# Patient Record
Sex: FEMALE | Race: WHITE | HISPANIC OR LATINO | Employment: UNEMPLOYED | URBAN - METROPOLITAN AREA
[De-identification: names, ages, dates, MRNs, and addresses within clinical notes are randomized per-mention and may not be internally consistent; named-entity substitution may affect disease eponyms.]

---

## 2017-10-16 ENCOUNTER — NON-PROVIDER VISIT (OUTPATIENT)
Dept: OBGYN | Facility: CLINIC | Age: 22
End: 2017-10-16
Payer: MEDICAID

## 2017-10-16 DIAGNOSIS — Z32.01 PREGNANCY EXAMINATION OR TEST, POSITIVE RESULT: ICD-10-CM

## 2017-10-16 LAB
INT CON NEG: NEGATIVE
INT CON POS: POSITIVE
POC URINE PREGNANCY TEST: POSITIVE

## 2017-10-16 PROCEDURE — 81025 URINE PREGNANCY TEST: CPT | Performed by: OBSTETRICS & GYNECOLOGY

## 2018-01-19 ENCOUNTER — INITIAL PRENATAL (OUTPATIENT)
Dept: OBGYN | Facility: CLINIC | Age: 23
End: 2018-01-19
Payer: MEDICAID

## 2018-01-19 DIAGNOSIS — N93.8 DUB (DYSFUNCTIONAL UTERINE BLEEDING): ICD-10-CM

## 2018-01-19 LAB — IN CLINIC OB SCAN: NORMAL

## 2018-01-19 PROCEDURE — 99203 OFFICE O/P NEW LOW 30 MIN: CPT | Mod: 25 | Performed by: OBSTETRICS & GYNECOLOGY

## 2018-01-19 PROCEDURE — 76856 US EXAM PELVIC COMPLETE: CPT | Performed by: OBSTETRICS & GYNECOLOGY

## 2018-01-19 NOTE — PROGRESS NOTES
Chief Complaint   Patient presents with   • Other             History of present illness:   22 y.o.  unknown LMP presents for confirmation of pregnancy  Doing fine  (+) FM  No contractions  No LOF< no VB    Review of systems:  Pertinent positives documented in HPI and all other systems reviewed & are negative  All PMH, PSH, allergies, social history and FH reviewed and updated today:  History reviewed. No pertinent past medical history.    History reviewed. No pertinent surgical history.    Allergies:   Allergies   Allergen Reactions   • Nkda [No Known Drug Allergy]        Social History     Social History   • Marital status: Single     Spouse name: N/A   • Number of children: N/A   • Years of education: N/A     Occupational History   • Not on file.     Social History Main Topics   • Smoking status: Never Smoker   • Smokeless tobacco: Never Used   • Alcohol use No   • Drug use: No   • Sexual activity: Yes     Partners: Male     Birth control/ protection: Condom      Comment: none      Other Topics Concern   • Not on file     Social History Narrative   • No narrative on file       History reviewed. No pertinent family history.    Physical exam:  unknown if currently breastfeeding.    General:appears stated age, is in no apparent distress, is well developed and well nourished  Head: normocephalic, non-tender  Abdomen: gravid 18-20 weeks size uterus  Skin: No rashes, or ulcers or lesions seen  Psychiatric: Patient shows appropriate affect, is alert and oriented x3, intact judgment and insight.  1. DUB (dysfunctional uterine bleeding)  POCT IN CLINIC OB SCAN   2. TAS shows a single live IUP 18w6d by fetal biometry good cardiac activity good motion. breech, anterior placenta, no previa, adeq AF JANELL 2018. no adnexal masses   3. Continue PNV  4. Hydrate  5. NEW OB 1 week

## 2018-02-07 ENCOUNTER — INITIAL PRENATAL (OUTPATIENT)
Dept: OBGYN | Facility: CLINIC | Age: 23
End: 2018-02-07
Payer: MEDICAID

## 2018-02-07 VITALS
DIASTOLIC BLOOD PRESSURE: 56 MMHG | SYSTOLIC BLOOD PRESSURE: 98 MMHG | BODY MASS INDEX: 20.91 KG/M2 | HEIGHT: 63 IN | WEIGHT: 118 LBS

## 2018-02-07 DIAGNOSIS — Z34.82 ENCOUNTER FOR SUPERVISION OF OTHER NORMAL PREGNANCY IN SECOND TRIMESTER: Primary | ICD-10-CM

## 2018-02-07 PROBLEM — Z34.92 ENCOUNTER FOR SUPERVISION OF NORMAL PREGNANCY IN SECOND TRIMESTER: Status: ACTIVE | Noted: 2018-02-07

## 2018-02-07 LAB
APPEARANCE UR: NORMAL
BILIRUB UR STRIP-MCNC: NORMAL MG/DL
COLOR UR AUTO: NORMAL
GLUCOSE UR STRIP.AUTO-MCNC: NORMAL MG/DL
KETONES UR STRIP.AUTO-MCNC: NORMAL MG/DL
LEUKOCYTE ESTERASE UR QL STRIP.AUTO: NORMAL
NITRITE UR QL STRIP.AUTO: NORMAL
PH UR STRIP.AUTO: 6 [PH] (ref 5–8)
PROT UR QL STRIP: NORMAL MG/DL
RBC UR QL AUTO: NORMAL
SP GR UR STRIP.AUTO: 1.03
UROBILINOGEN UR STRIP-MCNC: NORMAL MG/DL

## 2018-02-07 PROCEDURE — 81002 URINALYSIS NONAUTO W/O SCOPE: CPT | Performed by: NURSE PRACTITIONER

## 2018-02-07 PROCEDURE — 90686 IIV4 VACC NO PRSV 0.5 ML IM: CPT | Performed by: NURSE PRACTITIONER

## 2018-02-07 PROCEDURE — 59401 PR NEW OB VISIT: CPT | Performed by: NURSE PRACTITIONER

## 2018-02-07 PROCEDURE — 90471 IMMUNIZATION ADMIN: CPT | Performed by: NURSE PRACTITIONER

## 2018-02-07 ASSESSMENT — ENCOUNTER SYMPTOMS
CONSTITUTIONAL NEGATIVE: 1
MUSCULOSKELETAL NEGATIVE: 1
RESPIRATORY NEGATIVE: 1
EYES NEGATIVE: 1
PSYCHIATRIC NEGATIVE: 1
CARDIOVASCULAR NEGATIVE: 1
NEUROLOGICAL NEGATIVE: 1
GASTROINTESTINAL NEGATIVE: 1

## 2018-02-07 NOTE — LETTER
Cystic Fibrosis Carrier Testing  Vika Mckeon    The following information is about a blood test that can be done to determine if you and/or your partner carry the gene for cystic fibrosis.    WHAT IS CYSTIC FIBROSIS?  · Cystic fibrosis (CF) is an inherited disease that affects more than 25,000 American children and young adults.  · Symptoms of CF vary but include lung congestion, pneumonia, diarrhea and poor growth.  Most people with CF have severe medical problems and some die at a young age.  Others have so few symptoms they are unaware they have CF.  · CF does not affect intelligence.  · Although there is no cure for CF at this time, scientists are making progress in improving treatment and in searching for a cure.  In the past many people with CF  at a very young age.  Today, many are living into their 20’s and 30’s.    IS THERE A CHANCE MY BABY COULD HAVE CYSTIC FIBROSIS?  · You can have a child with CF even if there is no history in your family (see chart below).  · CF testing can help determine if you are a carrier and at risk to have a child with CF.  Note: if both parents are carriers, there is a 1 in 4 (25%) chance with each pregnancy that they will have a child with CF.  · Carriers have one normal CF gene and one altered CF gene.  · People with CF have two altered CF genes.  · Most people have two normal copies of the CF gene.    Approximate risk that a couple with no family history of cystic fibrosis will have a child with cystic fibrosis:    Ethnic background / Risk     couple:  1 in 2,500   couple:  1 in 15,000            couple:  1 in 8,000     American couple:  1 in 32,000     WHAT TESTING IS AVAILABLE?  · There is a blood test that can be done to find out if you or your partner is a carrier.  · It is important to understand that CF carrier testing does not detect all CF carriers.  · If the test shows that you are both CF carriers, you unborn baby can  be tested to find out if the baby has CF.    HOW MUCH DOES IT COST TO HAVE CYSTIC FIBROSIS CARRIER TESTING?  · Cost and insurance coverage for CF carrier testing vary depending upon the laboratory used and your insurance policy.  · The average cost for CF carrier testing is $300 per person.  · Your genetic counselor can provide you with more information about cystic fibrosis carrier testing.    _____  Yes, I am interested in discussing carrier testing with a genetic counselor.    _____  No, I am not interested in CF carrier testing or in receiving more information about CF carrier testing.      Client signature: ________________________________________  2/7/2018

## 2018-02-07 NOTE — LETTER
Cuente los Movimientos de naylor Bebé  Otro paso importante para la manohar de naylor bebé    Vika Vriza Mckeon     [facility]            Dept: [phone]    ¿Cuántas semanas tiene de embarazo? 21w4d    Fecha cuando tiene que comenzar a contar el movimiento: ***                  Rain debe usar fermín diagrama    Ml manera en que naylor doctor puede controlar a manohar de naylor bebé es sabiendo cuantas veces se mueve naylor bebé en el útero, o por medio de las “pataditas”.  Usted podrá ayudarle a naylor médico al usar cada día el siguiente diagrama.    Cada día, usted debe prestar atención a cuantas horas le lleva a naylor bebé moverse 10 veces.  Comience a contar en la mañana, lo antes posible después de haberse levantado.    · Primeramente, escriba la hora en que se mueve naylor bebé, hasta llegar a 10 veces.  · Colóquele un check o palomita a cada cuadrito cada vez que naylor bebé se mueva hasta que complete 10 veces.  · Escriba la hora cuando termine de contar 10 veces en la última columna.  · Sume el total del tiempo que le llevó contar los 10 movimientos.  · Finalmente, complete el cuadrito de cuantas horas le llevó hacerlo.    Después de henry contado los 10 movimientos, ya no tendrá que contar los demás movimientos por el isabel del día.  A la mañana siguiente, comience a contar de nuevo cuantas veces se mueve el bebé desde el momento en que se levante.    ¿Qué tendría que considerarse un “movimiento”?  Es difícil de decirlo porque es distinto de ml madre a otra, y de un embarazo a otro.  Lo importante es que cuente el movimiento de la misma manera amanda el transcurso de naylro embarazo.  Si tiene preguntas adicionales, pregúntele a naylor doctor.    ¡Cuente cuidadosamente cada día!     MUESTRA:  Semana 28    ¿Cuántas horas le ha llevado sentir 10 movimientos?        Hora de Inicio     1     2     3     4     5     6     7     8     9     10   Hora de Finlizar   Jeana. 8:20 ·  ·  ·  ·  ·  ·  ·  ·  ·  ·  11:40   Mar.               Mié.                Jue.               Vie.               Sáb.               Dom.                 IMPORTANTE:  Usted debe contactar a naylor doctor si le lleva más de 2 horas sentir 10 movimientos de naylor bebé.    Cada mañana, escriba la hora de inicio y comience a contar los movimientos de naylor bebé.  Hágalo colocándole un check o palomita a cada cuadrito cada vez que sienta un movimiento de naylor bebé.  Cuando haya sentido 10 “pataditas”, escriba la hora en que terminó de contar en la última columna.  Luego, complete en la cajita (arriba de la ramesh de check o palomita) el número total de horas que le llevó hacerlo.  Asegúrese de leer completamente las instrucciones en la página anterior.

## 2018-02-07 NOTE — LETTER
"Count Your Baby's Movements  Another step to a healthy delivery              Dept: [phone]    How Many Weeks Pregnant? (Start at 28 weeks)  Date to Begin Counting: ***              How to use this chart    One way for your physician to keep track of your baby's health is by knowing how often the baby moves (or \"kicks\") in your womb.  You can help your physician to do this by using this chart every day.    Every day, you should see how many hours it takes for your baby to move 10 times.  Start in the morning, as soon as you get up.    · First, write down the time your baby moves until you get to 10.  · Check off one box every time your baby moves until you get to 10.  · Write down the time you finished counting in the last column.  · Total how long it took to count up all 10 movements.  · Finally, fill in the box that shows how long this took.  After counting 10 movements, you no longer have to count any more that day.  The next morning, just start counting again as soon as you get up.    What should you call a \"movement\"?  It is hard to say, because it will feel different from one mother to another and from one pregnancy to the next.  The important thing is that you count the movements the same way throughout your pregnancy.  If you have more questions, you should ask your physician.    Count carefully every day!  SAMPLE:  Week 28    How many hours did it take to feel 10 movements?       Start  Time     1     2     3     4     5     6     7     8     9     10   Finish Time   Mon 8:20 ·  ·  ·  ·  ·  ·  ·  ·  ·  ·  11:40   Tue Wed Thu Fri               Sat               Sun                 IMPORTANT: You should contact your physician if it takes more than two hours for you to feel 10 movements.  Each morning, write down the time and start to count the movements of your baby.  Keep track by checking off one box every time you feel one movement.  When you have felt 10 " "\"kicks\", write down the time you finished counting in the last column.  Then fill in the   box (over the check jose) for the number of hours it took.  Be sure to read the complete instructions on the previous page.            "

## 2018-02-07 NOTE — PROGRESS NOTES
Pt here for New OB today  Phone: [phone]  Pharmacy verified  Pt is taking PNV  Desires AFP  Desires BTL   Flu Shot today   Pt c/o nose bleeds  Pt reports good FM, denies any VB, LOF and UC  Flu Shot given to patient on R Deltoid. Screening checklist reviewed with patient. VIS given

## 2018-02-07 NOTE — LETTER
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  This patient is pregnant and may continue to work.  She should not lift greater than 20 pounds and requires frequent rest periods (15 minutes every two hours).        Thank you,          AMMON Koehler.

## 2018-02-10 LAB
C TRACH DNA SPEC QL NAA+PROBE: NEGATIVE
N GONORRHOEA DNA SPEC QL NAA+PROBE: NEGATIVE

## 2018-02-12 ENCOUNTER — TELEPHONE (OUTPATIENT)
Dept: OBGYN | Facility: CLINIC | Age: 23
End: 2018-02-12

## 2018-02-14 LAB
2ND TRIMESTER 4 SCREEN SERPL-IMP: NORMAL
ABO GROUP BLD: NORMAL
BLD GP AB SCN SERPL QL: NEGATIVE
HBV SURFACE AG SERPL QL IA: NON REACTIVE
PLATELET # BLD AUTO: NORMAL 10*3/UL
RH BLD: NORMAL
RPR SER QL: NORMAL
RUBV IGG SERPL IA-ACNC: NORMAL

## 2018-02-15 DIAGNOSIS — R73.09 ELEVATED GLUCOSE TOLERANCE TEST: ICD-10-CM

## 2018-02-26 ENCOUNTER — APPOINTMENT (OUTPATIENT)
Dept: RADIOLOGY | Facility: IMAGING CENTER | Age: 23
End: 2018-02-26
Attending: NURSE PRACTITIONER
Payer: MEDICAID

## 2018-02-26 ENCOUNTER — DATING (OUTPATIENT)
Dept: OBGYN | Facility: CLINIC | Age: 23
End: 2018-02-26

## 2018-02-26 DIAGNOSIS — Z34.82 ENCOUNTER FOR SUPERVISION OF OTHER NORMAL PREGNANCY IN SECOND TRIMESTER: ICD-10-CM

## 2018-02-26 PROCEDURE — 76805 OB US >/= 14 WKS SNGL FETUS: CPT | Mod: 26 | Performed by: OBSTETRICS & GYNECOLOGY

## 2018-03-07 ENCOUNTER — ROUTINE PRENATAL (OUTPATIENT)
Dept: OBGYN | Facility: CLINIC | Age: 23
End: 2018-03-07

## 2018-03-07 VITALS — DIASTOLIC BLOOD PRESSURE: 58 MMHG | SYSTOLIC BLOOD PRESSURE: 100 MMHG | BODY MASS INDEX: 21.61 KG/M2 | WEIGHT: 122 LBS

## 2018-03-07 DIAGNOSIS — Z34.82 ENCOUNTER FOR SUPERVISION OF OTHER NORMAL PREGNANCY IN SECOND TRIMESTER: ICD-10-CM

## 2018-03-07 PROCEDURE — 90040 PR PRENATAL FOLLOW UP: CPT | Performed by: NURSE PRACTITIONER

## 2018-03-07 ASSESSMENT — PATIENT HEALTH QUESTIONNAIRE - PHQ9: CLINICAL INTERPRETATION OF PHQ2 SCORE: 0

## 2018-03-07 NOTE — PROGRESS NOTES
Ob f/u. + fetal movement good  No VB, LOF or contractions   C/O no complaints today   Phone number # [phone]  Pharmacy verified with patient  WT=   122 lbs           IL=635/[id number]  3rd trimester lab orders pended and instructions given to patient  HIV lab ordered today   Pt denies any discharge, with odor, burning, itching or painful urination

## 2018-03-07 NOTE — PROGRESS NOTES
SUBJECTIVE:  Pt is a 22 y.o.   at 25w4d  gestation. Presents today for follow-up prenatal care. Reports no issues at this time.  Reports  fetal movement. Denies cramping/contractions, bleeding or leaking of fluid. Denies dysuria, headaches, N/V, or other issues at this time. Generally feels well today.     OBJECTIVE:  - See prenatal vitals flow  Vitals:    18 1004   BP: 100/58   Weight: 55.3 kg (122 lb)                 ASSESSMENT:   - IUP at 25w4d by 18 week US   - S=D  Patient Active Problem List    Diagnosis Date Noted   • Encounter for supervision of normal pregnancy in second trimester 2018   • Rubella equivocal 2014         PLAN:  - GCT ordered along with HIV   - S/sx pregnancy and labor warning signs vs general discomforts discussed  - Fetal movements and kick counts reviewed   - Adequate hydration reinforced  - Nutrition/exercise/vitamin education; continued PNV  - S/p Flu vacc  - Anticipatory guidance given  - RTC in 3 weeks for follow-up prenatal care

## 2018-03-12 LAB
GLUCOSE 1H P 50 G GLC PO SERPL-MCNC: NORMAL MG/DL
HCT VFR BLD AUTO: NORMAL %
HGB BLD-MCNC: NORMAL G/DL
HIV 1 0 2 IC ZHVIC: NON REACTIVE
TREPONEMA PALLIDUM IGG+IGM AB [PRESENCE] IN SERUM OR PLASMA BY IMMUNOASSAY: NON REACTIVE

## 2018-03-14 ENCOUNTER — TELEPHONE (OUTPATIENT)
Dept: OBGYN | Facility: CLINIC | Age: 23
End: 2018-03-14

## 2018-03-14 DIAGNOSIS — R73.09 ELEVATED GLUCOSE TOLERANCE TEST: ICD-10-CM

## 2018-03-14 NOTE — TELEPHONE ENCOUNTER
Pt walked in today 03/14/2018 to  Get  Her lab results     AMMON Grider  Reviewed the labs and signed  1 hr glucose was abnormal. Provider order 3 hr glucose test  Pt was informed. Instructions given with the 3 hour glucose lab slip.and verbalized understanding   No further questions.

## 2018-03-14 NOTE — TELEPHONE ENCOUNTER
Elevated 1 hr GTT results, needs 3 hr test.    Unable to contact pt msg left to call back.     3/14/18 @ 5327 pt returned call.  Pt notified of abnormal 1hr gtt and need to do 3hr gtt this time. Pt instructed to fast 8-10 hrs  pt only allow to drink plain water during fasting time. Pt will call lab to schedule an appt. Advised to bring a snack for after the test is done. Pt notified will be staying in the labs for the 3hr. Pt agreed to do it this week. Pt verbalized understanding.    Pt will  lab slip to go to [facility].

## 2018-03-18 LAB — GLUCOSE 3H P CHAL SERPL-MCNC: NORMAL MG/DL

## 2018-03-27 ENCOUNTER — TELEPHONE (OUTPATIENT)
Dept: OBGYN | Facility: CLINIC | Age: 23
End: 2018-03-27

## 2018-03-28 ENCOUNTER — ROUTINE PRENATAL (OUTPATIENT)
Dept: OBGYN | Facility: CLINIC | Age: 23
End: 2018-03-28
Payer: MEDICAID

## 2018-03-28 VITALS — WEIGHT: 122 LBS | BODY MASS INDEX: 21.61 KG/M2 | SYSTOLIC BLOOD PRESSURE: 112 MMHG | DIASTOLIC BLOOD PRESSURE: 60 MMHG

## 2018-03-28 DIAGNOSIS — Z34.82 ENCOUNTER FOR SUPERVISION OF OTHER NORMAL PREGNANCY IN SECOND TRIMESTER: ICD-10-CM

## 2018-03-28 DIAGNOSIS — R73.09 ELEVATED GLUCOSE TOLERANCE TEST: ICD-10-CM

## 2018-03-28 PROCEDURE — 90040 PR PRENATAL FOLLOW UP: CPT | Performed by: NURSE PRACTITIONER

## 2018-03-28 NOTE — PROGRESS NOTES
Pt here today for OB follow up  Reports +FM  WT: 122 lb  BP: 112/60  ADIEL sheet given and explained today  Declines Tdap vaccine  Pt states no complaints today  Declines BTL  Good # [phone]

## 2018-03-28 NOTE — PROGRESS NOTES
SUBJECTIVE:  Pt is a 22 y.o.   at 28w4d  gestation. Presents today for follow-up prenatal care. Reports no issues at this time.  Reports good  fetal movement. Denies cramping/contractions, bleeding or leaking of fluid. Denies dysuria, headaches, N/V, or other issues at this time. Generally feels well today.     OBJECTIVE:  - See prenatal vitals flow  -   Vitals:    18 1618   BP: 112/60   Weight: 55.3 kg (122 lb)      Labs - normal prenatal panel, rubella equiv. Normal 3 hour gucose.   US - normal fetal survey            ASSESSMENT:   - IUP at 28w4d   - S=D   -   Patient Active Problem List    Diagnosis Date Noted   • normal 3 hour glucose.  2018   • Encounter for supervision of normal pregnancy in second trimester 2018   • Rubella equivocal 2014         PLAN:  - S/sx pregnancy and labor warning signs vs general discomforts discussed  - Fetal movements and kick counts reviewed   - Adequate hydration reinforced  - Nutrition/exercise/vitamin education; continued PNV  -declines BTL  Declines Tdap

## 2018-03-28 NOTE — LETTER
"Count Your Baby's Movements  Another step to a healthy delivery    Vika Mckeon             Dept: [phone]    How Many Weeks Pregnant? 28W4D    Date to Begin Countin2018              How to use this chart    One way for your physician to keep track of your baby's health is by knowing how often the baby moves (or \"kicks\") in your womb.  You can help your physician to do this by using this chart every day.    Every day, you should see how many hours it takes for your baby to move 10 times.  Start in the morning, as soon as you get up.    · First, write down the time your baby moves until you get to 10.  · Check off one box every time your baby moves until you get to 10.  · Write down the time you finished counting in the last column.  · Total how long it took to count up all 10 movements.  · Finally, fill in the box that shows how long this took.  After counting 10 movements, you no longer have to count any more that day.  The next morning, just start counting again as soon as you get up.    What should you call a \"movement\"?  It is hard to say, because it will feel different from one mother to another and from one pregnancy to the next.  The important thing is that you count the movements the same way throughout your pregnancy.  If you have more questions, you should ask your physician.    Count carefully every day!  SAMPLE:  Week 28    How many hours did it take to feel 10 movements?       Start  Time     1     2     3     4     5     6     7     8     9     10   Finish Time   Mon 8:20 ·  ·  ·  ·  ·  ·  ·  ·  ·  ·  11:40                  Sat               Sun                 IMPORTANT: You should contact your physician if it takes more than two hours for you to feel 10 movements.  Each morning, write down the time and start to count the movements of your baby.  Keep track by checking off one box every time you feel one movement.  When you have " "felt 10 \"kicks\", write down the time you finished counting in the last column.  Then fill in the   box (over the check jose) for the number of hours it took.  Be sure to read the complete instructions on the previous page.            "

## 2018-04-11 ENCOUNTER — ROUTINE PRENATAL (OUTPATIENT)
Dept: OBGYN | Facility: CLINIC | Age: 23
End: 2018-04-11
Payer: MEDICAID

## 2018-04-11 VITALS — WEIGHT: 125 LBS | SYSTOLIC BLOOD PRESSURE: 98 MMHG | DIASTOLIC BLOOD PRESSURE: 62 MMHG | BODY MASS INDEX: 22.14 KG/M2

## 2018-04-11 DIAGNOSIS — Z34.83 ENCOUNTER FOR SUPERVISION OF OTHER NORMAL PREGNANCY IN THIRD TRIMESTER: ICD-10-CM

## 2018-04-11 DIAGNOSIS — O36.8190 DECREASED FETAL MOVEMENT DURING PREGNANCY, ANTEPARTUM, SINGLE OR UNSPECIFIED FETUS: ICD-10-CM

## 2018-04-11 PROBLEM — Z34.93 ENCOUNTER FOR SUPERVISION OF NORMAL PREGNANCY IN THIRD TRIMESTER: Status: ACTIVE | Noted: 2018-02-07

## 2018-04-11 LAB
NST ACOUSTIC STIMULATION: NORMAL
NST ACTION NECESSARY: NORMAL
NST ASSESSMENT: NORMAL
NST BASELINE: 135
NST INDICATIONS: NORMAL
NST OTHER DATA: NORMAL
NST READ BY: NORMAL
NST RETURN: NORMAL
NST UTERINE ACTIVITY: NORMAL

## 2018-04-11 PROCEDURE — 90715 TDAP VACCINE 7 YRS/> IM: CPT | Performed by: NURSE PRACTITIONER

## 2018-04-11 PROCEDURE — 90040 PR PRENATAL FOLLOW UP: CPT | Performed by: NURSE PRACTITIONER

## 2018-04-11 PROCEDURE — 90471 IMMUNIZATION ADMIN: CPT | Performed by: NURSE PRACTITIONER

## 2018-04-11 PROCEDURE — 59025 FETAL NON-STRESS TEST: CPT | Performed by: NURSE PRACTITIONER

## 2018-04-11 NOTE — PROGRESS NOTES
OB F/U  Denies VB, LOF, or UC  Phone#: [phone]  Pharmacy Confirmed.  C/O: decrease FM indicates she has experience 4 movements today, patient states she thinks she might be starting to loose her mucus plug.

## 2018-04-11 NOTE — PROGRESS NOTES
SUBJECTIVE:  Pt is a 22 y.o.   at 30w4d  gestation. Presents today for follow-up prenatal care. Reports no issues at this time.  Reports kicks not as strong fetal movement, has not been doing kick counts. Denies cramping/contractions, bleeding or leaking of fluid. Denies dysuria, headaches, N/V or other issues at this time. Generally feels well today.     OBJECTIVE:  - See prenatal vitals flow  Vitals:    18 1401   BP: (!) 98/62   Weight: 56.7 kg (125 lb)                 ASSESSMENT:   - IUP at 30w4d by 18 week US   - S=D   -   Patient Active Problem List    Diagnosis Date Noted   • Encounter for supervision of normal pregnancy in third trimester 2018   • Rubella equivocal 2014         PLAN:  - For NST now: reactive tracing for 30 weeks, 10 movements appreciated in NST time  - Reeducated on how to do FKC  - S/sx pregnancy and labor warning signs vs general discomforts discussed  - Fetal movements and kick counts reviewed   - Adequate hydration reinforced  - Nutrition/exercise/vitamin education; continued PNV   - S/p TDAP vacc today   - S/p Flu vacc  - Anticipatory guidance given  - RTC in 2 weeks for follow-up prenatal care

## 2018-04-11 NOTE — NON-PROVIDER
TDap given to patient. R  Deltoid. Screening checklist reviewed with patient. VIS given. Verified by AO

## 2018-05-01 ENCOUNTER — ROUTINE PRENATAL (OUTPATIENT)
Dept: OBGYN | Facility: CLINIC | Age: 23
End: 2018-05-01
Payer: MEDICAID

## 2018-05-01 VITALS — BODY MASS INDEX: 22.67 KG/M2 | WEIGHT: 128 LBS | DIASTOLIC BLOOD PRESSURE: 60 MMHG | SYSTOLIC BLOOD PRESSURE: 100 MMHG

## 2018-05-01 DIAGNOSIS — Z34.83 ENCOUNTER FOR SUPERVISION OF OTHER NORMAL PREGNANCY IN THIRD TRIMESTER: Primary | ICD-10-CM

## 2018-05-01 PROCEDURE — 90040 PR PRENATAL FOLLOW UP: CPT | Performed by: NURSE PRACTITIONER

## 2018-05-01 NOTE — PROGRESS NOTES
S:  Pt is  at 33w3d for routine OB follow up.  Reports some vaginal pain.  Reports good FM.  Denies VB, LOF, RUCs or vaginal DC.    O:  Please see above vitals.        FHTs: 158        Fundal ht: 33 cm.    A:  IUP at 33w3d  Patient Active Problem List    Diagnosis Date Noted   • Encounter for supervision of normal pregnancy in third trimester 2018   • Rubella equivocal 2014        P:  1.  GBS @ 35 wks.          2.  Continue FKCs.          3.  Questions answered.          4.  Encouraged pt to tour L&D.          5.  Encourage adequate water intake.        6.  F/u 2 wks.        7.  D/w pt helps for vaginal pain.

## 2018-05-01 NOTE — PATIENT INSTRUCTIONS
P:  1.  GBS @ 35 wks.          2.  Continue FKCs.          3.  Questions answered.          4.  Encouraged pt to tour L&D.          5.  Encourage adequate water intake.        6.  F/u 2 wks.        7.  D/w pt helps for vaginal pain.

## 2018-05-01 NOTE — PROGRESS NOTES
Pt. Here for OB/FU today. Reports Good FM.   Good # [phone]  Pt states having some cramping and her vaginal feels sore.   Pharmacy verified.

## 2018-05-09 ENCOUNTER — TELEPHONE (OUTPATIENT)
Dept: OBGYN | Facility: CLINIC | Age: 23
End: 2018-05-09

## 2018-05-09 NOTE — TELEPHONE ENCOUNTER
"Pt's call was transferred to me by Kavya JUAREZ). I spoke with pt. Pt stated for the past 2 days she has been having pelvic pressure \" it hurts to walk\", cramping off and on, and belly tightness 1-2 hours apart for the past 3 days. Reported +FM, denied vaginal bleeding or LOF. Per consult with Hortensia Silverman pt given  labor precautions, pt was also instructed to increase her water intake to 3-4 liters daily, rest as  Much as she can and try taking a warm bath. Advised that if any of the above symptoms worsens to go to L&D for evaluation. Pt verbalized understanding and will comply with instructions and had no further questions.  "

## 2018-05-15 ENCOUNTER — ROUTINE PRENATAL (OUTPATIENT)
Dept: OBGYN | Facility: CLINIC | Age: 23
End: 2018-05-15
Payer: MEDICAID

## 2018-05-15 VITALS — BODY MASS INDEX: 22.5 KG/M2 | WEIGHT: 127 LBS | DIASTOLIC BLOOD PRESSURE: 64 MMHG | SYSTOLIC BLOOD PRESSURE: 118 MMHG

## 2018-05-15 DIAGNOSIS — B96.89 BV (BACTERIAL VAGINOSIS): ICD-10-CM

## 2018-05-15 DIAGNOSIS — Z34.83 ENCOUNTER FOR SUPERVISION OF OTHER NORMAL PREGNANCY IN THIRD TRIMESTER: Primary | ICD-10-CM

## 2018-05-15 DIAGNOSIS — N76.0 BV (BACTERIAL VAGINOSIS): ICD-10-CM

## 2018-05-15 PROCEDURE — 90040 PR PRENATAL FOLLOW UP: CPT | Performed by: NURSE PRACTITIONER

## 2018-05-15 RX ORDER — METRONIDAZOLE 500 MG/1
500 TABLET ORAL EVERY 12 HOURS
Qty: 14 TAB | Refills: 0 | Status: SHIPPED | OUTPATIENT
Start: 2018-05-15 | End: 2018-05-22

## 2018-05-15 NOTE — PROGRESS NOTES
S:  Pt is  at 35w3d here for routine OB follow up.  Reports questionable LOF and vaginal pain.  Reports good FM.  Denies VB, RUCs, or vaginal DC.     O:  Please see above vitals.        FHTs: 144        Fundal ht: 35 cm.        Fetal position: vertex.        SVE: cl/50/-3        SSE: neg ferning, neg nitrazine, neg pooling.        Wet mount: +clue cells    A:  IUP at 35w3d  Patient Active Problem List    Diagnosis Date Noted   • BV (bacterial vaginosis) 05/15/2018   • Encounter for supervision of normal pregnancy in third trimester 2018   • Rubella equivocal 2014       P:  1.  GBS obtained.          2.  Labor precautions given.  Instructions given on where to go.  Pt receptive to              education.          3.  Questions answered.          4.  Continue FKCs.          5.  Encouraged adequate water intake        6.  F/u 1 wk.        7.  Rx sent to pharmacy.

## 2018-05-15 NOTE — PROGRESS NOTES
OB f/u. + fetal movement.  No VB, LOF or UC's.  Wt:127lb       BP:118/64  Good phone # [phone]  Preferred pharmacy confirmed.  GBS today  c/o ? Leaking of fluid past weekend and yesterday

## 2018-05-15 NOTE — PATIENT INSTRUCTIONS
P:  1.  GBS obtained.          2.  Labor precautions given.  Instructions given on where to go.  Pt receptive to              education.          3.  Questions answered.          4.  Continue FKCs.          5.  Encouraged adequate water intake        6.  F/u 1 wk.        7.  Rx sent to pharmacy.

## 2018-05-18 LAB — GP B STREP DNA SPEC QL NAA+PROBE: NEGATIVE

## 2018-05-24 ENCOUNTER — ROUTINE PRENATAL (OUTPATIENT)
Dept: OBGYN | Facility: CLINIC | Age: 23
End: 2018-05-24
Payer: MEDICAID

## 2018-05-24 VITALS — DIASTOLIC BLOOD PRESSURE: 62 MMHG | BODY MASS INDEX: 22.85 KG/M2 | WEIGHT: 129 LBS | SYSTOLIC BLOOD PRESSURE: 98 MMHG

## 2018-05-24 DIAGNOSIS — N76.0 BV (BACTERIAL VAGINOSIS): ICD-10-CM

## 2018-05-24 DIAGNOSIS — B96.89 BV (BACTERIAL VAGINOSIS): ICD-10-CM

## 2018-05-24 DIAGNOSIS — Z34.83 ENCOUNTER FOR SUPERVISION OF OTHER NORMAL PREGNANCY IN THIRD TRIMESTER: ICD-10-CM

## 2018-05-24 PROCEDURE — 90040 PR PRENATAL FOLLOW UP: CPT | Performed by: PHYSICIAN ASSISTANT

## 2018-05-24 NOTE — PROGRESS NOTES
Ob f/u. + fetal movement good  No VB, LOF or contractions   C/O faisal simental   Phone number # [phone]  Pharmacy verified with patient  WT=   129 lbs           BP=98/62  GBS negative

## 2018-05-24 NOTE — PROGRESS NOTES
Pt has no complaints with cramping, strong or regular UCs, VB, LOF. +FM. GBS neg - pt notified of results. Labor precautions reviewed. Cervix: 1-2/75-0, post, soft, vtx. Daily FKC and walks recommended. RTC 1 wk or sooner prn.

## 2018-05-30 ENCOUNTER — ROUTINE PRENATAL (OUTPATIENT)
Dept: OBGYN | Facility: CLINIC | Age: 23
End: 2018-05-30
Payer: MEDICAID

## 2018-05-30 VITALS — SYSTOLIC BLOOD PRESSURE: 102 MMHG | WEIGHT: 129 LBS | BODY MASS INDEX: 22.85 KG/M2 | DIASTOLIC BLOOD PRESSURE: 66 MMHG

## 2018-05-30 DIAGNOSIS — Z34.83 ENCOUNTER FOR SUPERVISION OF OTHER NORMAL PREGNANCY IN THIRD TRIMESTER: ICD-10-CM

## 2018-05-30 PROCEDURE — 90040 PR PRENATAL FOLLOW UP: CPT | Performed by: NURSE PRACTITIONER

## 2018-05-30 ASSESSMENT — PATIENT HEALTH QUESTIONNAIRE - PHQ9: CLINICAL INTERPRETATION OF PHQ2 SCORE: 0

## 2018-05-30 NOTE — PROGRESS NOTES
Ob f/u. + fetal movement good  No VB, LOF or contractions   C/O pain and contractions   Phone number # [phone]  Pharmacy verified with patient  WT=  129 lbs            TR=668/66

## 2018-06-04 ENCOUNTER — TELEPHONE (OUTPATIENT)
Dept: OBGYN | Facility: CLINIC | Age: 23
End: 2018-06-04

## 2018-06-04 NOTE — TELEPHONE ENCOUNTER
C/O spotting, having some UC's 3 in 1 hr, reports +FM, denies other complications.  labor precautions given(* LOF,  VB or UC's 3-5 minaprt in 2 hrs), verbalized understanding.

## 2018-06-08 ENCOUNTER — ROUTINE PRENATAL (OUTPATIENT)
Dept: OBGYN | Facility: CLINIC | Age: 23
End: 2018-06-08
Payer: MEDICAID

## 2018-06-08 VITALS — DIASTOLIC BLOOD PRESSURE: 62 MMHG | BODY MASS INDEX: 23.21 KG/M2 | WEIGHT: 131 LBS | SYSTOLIC BLOOD PRESSURE: 98 MMHG

## 2018-06-08 DIAGNOSIS — Z34.83 ENCOUNTER FOR SUPERVISION OF OTHER NORMAL PREGNANCY IN THIRD TRIMESTER: ICD-10-CM

## 2018-06-08 PROCEDURE — 90040 PR PRENATAL FOLLOW UP: CPT | Performed by: PHYSICIAN ASSISTANT

## 2018-06-08 NOTE — PROGRESS NOTES
Pt has no complaints with cramping, strong or regular UCs, VB, LOF, though pt does have occ UCs up to q 5-7 minutes apart. Pt hasnt gone in because they arent strong. +FM. Cervix: 3-4/75/-1, post, med, vtx. Labor precautions reviewed. Daily FKC and walks recommended. RTC 1 wk or sooner prn. IOL referral sent today.

## 2018-06-08 NOTE — PROGRESS NOTES
Pt. Here for OB/FU today. Reports Good FM.   Good # [phone]  Pt states she believes she is having [place] Kowalski.    Pharmacy verified.

## 2018-06-10 ENCOUNTER — HOSPITAL ENCOUNTER (OUTPATIENT)
Facility: MEDICAL CENTER | Age: 23
End: 2018-06-10
Attending: OBSTETRICS & GYNECOLOGY | Admitting: OBSTETRICS & GYNECOLOGY
Payer: MEDICAID

## 2018-06-10 ENCOUNTER — HOSPITAL ENCOUNTER (INPATIENT)
Facility: MEDICAL CENTER | Age: 23
LOS: 2 days | End: 2018-06-12
Attending: OBSTETRICS & GYNECOLOGY | Admitting: OBSTETRICS & GYNECOLOGY
Payer: MEDICAID

## 2018-06-10 VITALS
TEMPERATURE: 97.8 F | RESPIRATION RATE: 18 BRPM | HEIGHT: 63 IN | HEART RATE: 71 BPM | WEIGHT: 131 LBS | BODY MASS INDEX: 23.21 KG/M2 | SYSTOLIC BLOOD PRESSURE: 128 MMHG | DIASTOLIC BLOOD PRESSURE: 80 MMHG

## 2018-06-10 LAB
BASOPHILS # BLD AUTO: 0.3 % (ref 0–1.8)
BASOPHILS # BLD: 0.03 K/UL (ref 0–0.12)
EOSINOPHIL # BLD AUTO: 0.03 K/UL (ref 0–0.51)
EOSINOPHIL NFR BLD: 0.3 % (ref 0–6.9)
ERYTHROCYTE [DISTWIDTH] IN BLOOD BY AUTOMATED COUNT: 37.2 FL (ref 35.9–50)
HCT VFR BLD AUTO: 35.6 % (ref 37–47)
HGB BLD-MCNC: 12.1 G/DL (ref 12–16)
HOLDING TUBE BB 8507: NORMAL
IMM GRANULOCYTES # BLD AUTO: 0.09 K/UL (ref 0–0.11)
IMM GRANULOCYTES NFR BLD AUTO: 0.8 % (ref 0–0.9)
LYMPHOCYTES # BLD AUTO: 1.97 K/UL (ref 1–4.8)
LYMPHOCYTES NFR BLD: 17.1 % (ref 22–41)
MCH RBC QN AUTO: 28.6 PG (ref 27–33)
MCHC RBC AUTO-ENTMCNC: 34 G/DL (ref 33.6–35)
MCV RBC AUTO: 84.2 FL (ref 81.4–97.8)
MONOCYTES # BLD AUTO: 0.62 K/UL (ref 0–0.85)
MONOCYTES NFR BLD AUTO: 5.4 % (ref 0–13.4)
NEUTROPHILS # BLD AUTO: 8.75 K/UL (ref 2–7.15)
NEUTROPHILS NFR BLD: 76.1 % (ref 44–72)
NRBC # BLD AUTO: 0 K/UL
NRBC BLD-RTO: 0 /100 WBC
PLATELET # BLD AUTO: 222 K/UL (ref 164–446)
PMV BLD AUTO: 9 FL (ref 9–12.9)
RBC # BLD AUTO: 4.23 M/UL (ref 4.2–5.4)
WBC # BLD AUTO: 11.5 K/UL (ref 4.8–10.8)

## 2018-06-10 PROCEDURE — 85025 COMPLETE CBC W/AUTO DIFF WBC: CPT

## 2018-06-10 PROCEDURE — 700101 HCHG RX REV CODE 250

## 2018-06-10 PROCEDURE — 304965 HCHG RECOVERY SERVICES

## 2018-06-10 PROCEDURE — 0UQMXZZ REPAIR VULVA, EXTERNAL APPROACH: ICD-10-PCS | Performed by: OBSTETRICS & GYNECOLOGY

## 2018-06-10 PROCEDURE — 10907ZC DRAINAGE OF AMNIOTIC FLUID, THERAPEUTIC FROM PRODUCTS OF CONCEPTION, VIA NATURAL OR ARTIFICIAL OPENING: ICD-10-PCS | Performed by: OBSTETRICS & GYNECOLOGY

## 2018-06-10 PROCEDURE — 770002 HCHG ROOM/CARE - OB PRIVATE (112)

## 2018-06-10 PROCEDURE — 36415 COLL VENOUS BLD VENIPUNCTURE: CPT

## 2018-06-10 PROCEDURE — 700105 HCHG RX REV CODE 258: Performed by: NURSE PRACTITIONER

## 2018-06-10 PROCEDURE — 700111 HCHG RX REV CODE 636 W/ 250 OVERRIDE (IP): Performed by: NURSE PRACTITIONER

## 2018-06-10 PROCEDURE — 59025 FETAL NON-STRESS TEST: CPT | Performed by: OBSTETRICS & GYNECOLOGY

## 2018-06-10 PROCEDURE — 59409 OBSTETRICAL CARE: CPT

## 2018-06-10 PROCEDURE — 700111 HCHG RX REV CODE 636 W/ 250 OVERRIDE (IP)

## 2018-06-10 RX ORDER — OXYCODONE HYDROCHLORIDE AND ACETAMINOPHEN 5; 325 MG/1; MG/1
1 TABLET ORAL EVERY 4 HOURS PRN
Status: DISCONTINUED | OUTPATIENT
Start: 2018-06-10 | End: 2018-06-11

## 2018-06-10 RX ORDER — SODIUM CHLORIDE, SODIUM LACTATE, POTASSIUM CHLORIDE, CALCIUM CHLORIDE 600; 310; 30; 20 MG/100ML; MG/100ML; MG/100ML; MG/100ML
INJECTION, SOLUTION INTRAVENOUS CONTINUOUS
Status: DISCONTINUED | OUTPATIENT
Start: 2018-06-10 | End: 2018-06-11 | Stop reason: HOSPADM

## 2018-06-10 RX ORDER — SODIUM CHLORIDE, SODIUM LACTATE, POTASSIUM CHLORIDE, CALCIUM CHLORIDE 600; 310; 30; 20 MG/100ML; MG/100ML; MG/100ML; MG/100ML
INJECTION, SOLUTION INTRAVENOUS
Status: ACTIVE
Start: 2018-06-10 | End: 2018-06-11

## 2018-06-10 RX ORDER — LIDOCAINE HYDROCHLORIDE AND EPINEPHRINE 15; 5 MG/ML; UG/ML
INJECTION, SOLUTION EPIDURAL
Status: COMPLETED
Start: 2018-06-10 | End: 2018-06-10

## 2018-06-10 RX ORDER — ONDANSETRON 4 MG/1
4 TABLET, ORALLY DISINTEGRATING ORAL EVERY 6 HOURS PRN
Status: DISCONTINUED | OUTPATIENT
Start: 2018-06-10 | End: 2018-06-12 | Stop reason: HOSPADM

## 2018-06-10 RX ORDER — LIDOCAINE HYDROCHLORIDE 10 MG/ML
INJECTION, SOLUTION EPIDURAL; INFILTRATION; INTRACAUDAL; PERINEURAL
Status: COMPLETED
Start: 2018-06-10 | End: 2018-06-10

## 2018-06-10 RX ORDER — MISOPROSTOL 200 UG/1
800 TABLET ORAL
Status: DISCONTINUED | OUTPATIENT
Start: 2018-06-10 | End: 2018-06-11 | Stop reason: HOSPADM

## 2018-06-10 RX ORDER — ALUMINA, MAGNESIA, AND SIMETHICONE 2400; 2400; 240 MG/30ML; MG/30ML; MG/30ML
30 SUSPENSION ORAL EVERY 6 HOURS PRN
Status: DISCONTINUED | OUTPATIENT
Start: 2018-06-10 | End: 2018-06-11 | Stop reason: HOSPADM

## 2018-06-10 RX ORDER — OXYCODONE HYDROCHLORIDE AND ACETAMINOPHEN 5; 325 MG/1; MG/1
2 TABLET ORAL EVERY 4 HOURS PRN
Status: DISCONTINUED | OUTPATIENT
Start: 2018-06-10 | End: 2018-06-12 | Stop reason: HOSPADM

## 2018-06-10 RX ORDER — ONDANSETRON 2 MG/ML
4 INJECTION INTRAMUSCULAR; INTRAVENOUS EVERY 6 HOURS PRN
Status: DISCONTINUED | OUTPATIENT
Start: 2018-06-10 | End: 2018-06-12 | Stop reason: HOSPADM

## 2018-06-10 RX ORDER — IBUPROFEN 600 MG/1
600 TABLET ORAL EVERY 6 HOURS PRN
Status: DISCONTINUED | OUTPATIENT
Start: 2018-06-10 | End: 2018-06-11

## 2018-06-10 RX ADMIN — SODIUM CHLORIDE, POTASSIUM CHLORIDE, SODIUM LACTATE AND CALCIUM CHLORIDE: 600; 310; 30; 20 INJECTION, SOLUTION INTRAVENOUS at 21:55

## 2018-06-10 RX ADMIN — FENTANYL CITRATE 100 MCG: 50 INJECTION INTRAMUSCULAR; INTRAVENOUS at 21:54

## 2018-06-10 RX ADMIN — LIDOCAINE HYDROCHLORIDE,EPINEPHRINE BITARTRATE: 15; .005 INJECTION, SOLUTION EPIDURAL; INFILTRATION; INTRACAUDAL; PERINEURAL at 23:00

## 2018-06-10 RX ADMIN — Medication 20 UNITS: at 23:00

## 2018-06-10 ASSESSMENT — COPD QUESTIONNAIRES
DO YOU EVER COUGH UP ANY MUCUS OR PHLEGM?: NO/ONLY WITH OCCASIONAL COLDS OR INFECTIONS
HAVE YOU SMOKED AT LEAST 100 CIGARETTES IN YOUR ENTIRE LIFE: NO/DON'T KNOW
IN THE PAST 12 MONTHS DO YOU DO LESS THAN YOU USED TO BECAUSE OF YOUR BREATHING PROBLEMS: DISAGREE/UNSURE
DURING THE PAST 4 WEEKS HOW MUCH DID YOU FEEL SHORT OF BREATH: NONE/LITTLE OF THE TIME

## 2018-06-10 ASSESSMENT — PATIENT HEALTH QUESTIONNAIRE - PHQ9
1. LITTLE INTEREST OR PLEASURE IN DOING THINGS: NOT AT ALL
SUM OF ALL RESPONSES TO PHQ9 QUESTIONS 1 AND 2: 0
2. FEELING DOWN, DEPRESSED, IRRITABLE, OR HOPELESS: NOT AT ALL

## 2018-06-10 ASSESSMENT — LIFESTYLE VARIABLES
EVER_SMOKED: NEVER
ALCOHOL_USE: NO

## 2018-06-10 NOTE — PROGRESS NOTES
Assumed care of PT.    Billy  Calledmarkie to ambulate when reactive tracing obtained.     0728 PT walking.    0832 SVE unchanged.    Call to markie Pena to DC with reactive tracing and labor precautions.     0819 DC instructions dicussed per sheet, PT to return for AROM, VB or increased frequency/pain of UC's or creased FM.

## 2018-06-10 NOTE — PROGRESS NOTES
"Pt is a ; JANELL of ; making her 39w1d. Pt c/o painful UCs since 0300 this morning. Pt stated \" I have been feeling contractions since my appointment 2 days ago after they stretched my cervix to a 3.\" Pt noted some VB this morning after using the BR. Denies LOF and reports +FM. VSS. EFM and TOCO applied. SVE at /-1.     Report given to LIMA Bradley RN.   "

## 2018-06-11 LAB
ERYTHROCYTE [DISTWIDTH] IN BLOOD BY AUTOMATED COUNT: 39.2 FL (ref 35.9–50)
HCT VFR BLD AUTO: 38.9 % (ref 37–47)
HGB BLD-MCNC: 12.5 G/DL (ref 12–16)
MCH RBC QN AUTO: 27.8 PG (ref 27–33)
MCHC RBC AUTO-ENTMCNC: 32.1 G/DL (ref 33.6–35)
MCV RBC AUTO: 86.4 FL (ref 81.4–97.8)
PLATELET # BLD AUTO: 173 K/UL (ref 164–446)
PMV BLD AUTO: 9 FL (ref 9–12.9)
RBC # BLD AUTO: 4.5 M/UL (ref 4.2–5.4)
WBC # BLD AUTO: 12.8 K/UL (ref 4.8–10.8)

## 2018-06-11 PROCEDURE — 700105 HCHG RX REV CODE 258: Performed by: NURSE PRACTITIONER

## 2018-06-11 PROCEDURE — 770002 HCHG ROOM/CARE - OB PRIVATE (112)

## 2018-06-11 PROCEDURE — 36415 COLL VENOUS BLD VENIPUNCTURE: CPT

## 2018-06-11 PROCEDURE — 85027 COMPLETE CBC AUTOMATED: CPT

## 2018-06-11 PROCEDURE — A9270 NON-COVERED ITEM OR SERVICE: HCPCS | Performed by: NURSE PRACTITIONER

## 2018-06-11 PROCEDURE — 700111 HCHG RX REV CODE 636 W/ 250 OVERRIDE (IP): Performed by: NURSE PRACTITIONER

## 2018-06-11 PROCEDURE — 700102 HCHG RX REV CODE 250 W/ 637 OVERRIDE(OP): Performed by: NURSE PRACTITIONER

## 2018-06-11 RX ORDER — BISACODYL 10 MG
10 SUPPOSITORY, RECTAL RECTAL PRN
Status: DISCONTINUED | OUTPATIENT
Start: 2018-06-11 | End: 2018-06-12 | Stop reason: HOSPADM

## 2018-06-11 RX ORDER — MAG HYDROX/ALUMINUM HYD/SIMETH 400-400-40
1 SUSPENSION, ORAL (FINAL DOSE FORM) ORAL
Status: DISCONTINUED | OUTPATIENT
Start: 2018-06-11 | End: 2018-06-11

## 2018-06-11 RX ORDER — OXYCODONE HYDROCHLORIDE AND ACETAMINOPHEN 5; 325 MG/1; MG/1
1 TABLET ORAL EVERY 4 HOURS PRN
Status: DISCONTINUED | OUTPATIENT
Start: 2018-06-11 | End: 2018-06-12 | Stop reason: HOSPADM

## 2018-06-11 RX ORDER — DOCUSATE SODIUM 100 MG/1
100 CAPSULE, LIQUID FILLED ORAL 2 TIMES DAILY PRN
Status: DISCONTINUED | OUTPATIENT
Start: 2018-06-11 | End: 2018-06-12 | Stop reason: HOSPADM

## 2018-06-11 RX ORDER — MISOPROSTOL 200 UG/1
600 TABLET ORAL
Status: DISCONTINUED | OUTPATIENT
Start: 2018-06-11 | End: 2018-06-12 | Stop reason: HOSPADM

## 2018-06-11 RX ORDER — IBUPROFEN 800 MG/1
800 TABLET ORAL EVERY 6 HOURS PRN
Status: DISCONTINUED | OUTPATIENT
Start: 2018-06-11 | End: 2018-06-12 | Stop reason: HOSPADM

## 2018-06-11 RX ORDER — SODIUM CHLORIDE, SODIUM LACTATE, POTASSIUM CHLORIDE, CALCIUM CHLORIDE 600; 310; 30; 20 MG/100ML; MG/100ML; MG/100ML; MG/100ML
INJECTION, SOLUTION INTRAVENOUS PRN
Status: DISCONTINUED | OUTPATIENT
Start: 2018-06-11 | End: 2018-06-12 | Stop reason: HOSPADM

## 2018-06-11 RX ORDER — VITAMIN A ACETATE, BETA CAROTENE, ASCORBIC ACID, CHOLECALCIFEROL, .ALPHA.-TOCOPHEROL ACETATE, DL-, THIAMINE MONONITRATE, RIBOFLAVIN, NIACINAMIDE, PYRIDOXINE HYDROCHLORIDE, FOLIC ACID, CYANOCOBALAMIN, CALCIUM CARBONATE, FERROUS FUMARATE, ZINC OXIDE, CUPRIC OXIDE 3080; 12; 120; 400; 1; 1.84; 3; 20; 22; 920; 25; 200; 27; 10; 2 [IU]/1; UG/1; MG/1; [IU]/1; MG/1; MG/1; MG/1; MG/1; MG/1; [IU]/1; MG/1; MG/1; MG/1; MG/1; MG/1
1 TABLET, FILM COATED ORAL EVERY MORNING
Status: DISCONTINUED | OUTPATIENT
Start: 2018-06-11 | End: 2018-06-12 | Stop reason: HOSPADM

## 2018-06-11 RX ADMIN — Medication 1 TABLET: at 10:02

## 2018-06-11 RX ADMIN — IBUPROFEN 600 MG: 600 TABLET, FILM COATED ORAL at 00:04

## 2018-06-11 RX ADMIN — SODIUM CHLORIDE, POTASSIUM CHLORIDE, SODIUM LACTATE AND CALCIUM CHLORIDE: 600; 310; 30; 20 INJECTION, SOLUTION INTRAVENOUS at 01:31

## 2018-06-11 RX ADMIN — IBUPROFEN 800 MG: 800 TABLET, FILM COATED ORAL at 06:09

## 2018-06-11 RX ADMIN — IBUPROFEN 800 MG: 800 TABLET, FILM COATED ORAL at 19:12

## 2018-06-11 RX ADMIN — Medication 125 ML/HR: at 00:05

## 2018-06-11 RX ADMIN — OXYCODONE HYDROCHLORIDE AND ACETAMINOPHEN 2 TABLET: 5; 325 TABLET ORAL at 20:32

## 2018-06-11 ASSESSMENT — PAIN SCALES - GENERAL
PAINLEVEL_OUTOF10: 2
PAINLEVEL_OUTOF10: 0
PAINLEVEL_OUTOF10: 0
PAINLEVEL_OUTOF10: 8
PAINLEVEL_OUTOF10: 3
PAINLEVEL_OUTOF10: 6
PAINLEVEL_OUTOF10: 0
PAINLEVEL_OUTOF10: 2
PAINLEVEL_OUTOF10: 6

## 2018-06-11 NOTE — H&P
History and Physical      Vika Mckeon is a 22 y.o. female  at 39w1d who presents for active labor.     Subjective:   positive  For CTXS.   positive Feels pain   negative for LOF  negative for vaginal bleeding.   positive for fetal movement    ROS: Pertinent items are noted in HPI.    History reviewed. No pertinent past medical history.  History reviewed. No pertinent surgical history.  OB History    Para Term  AB Living   2 1 1     1   SAB TAB Ectopic Molar Multiple Live Births             1      # Outcome Date GA Lbr Harish/2nd Weight Sex Delivery Anes PTL Lv   2 Current            1 Term 05/16/15 39w2d  3.11 kg (6 lb 13.7 oz) F Vag-Spont   SOWMYA      Birth Comments: No complications         Social History     Social History   • Marital status: Single     Spouse name: N/A   • Number of children: N/A   • Years of education: N/A     Occupational History   • Not on file.     Social History Main Topics   • Smoking status: Never Smoker   • Smokeless tobacco: Never Used   • Alcohol use No   • Drug use: No   • Sexual activity: Yes     Partners: Male     Birth control/ protection: Condom      Comment: none      Other Topics Concern   • Not on file     Social History Narrative   • No narrative on file     Allergies: Nkda [no known drug allergy]    Current Facility-Administered Medications:   •  LACTATED RINGERS IV SOLN, , , ,   •  oxytocin (PITOCIN) 20 UNITS/1000ML LR, , , ,   •  LR infusion, , Intravenous, Continuous, Hortensia Silverman, D.N.P., Last Rate: 125 mL/hr at 06/10/18 2155  •  fentaNYL (SUBLIMAZE) injection 50 mcg, 50 mcg, Intravenous, Q HOUR PRN, Hortensia Silverman, D.N.P.  •  fentaNYL (SUBLIMAZE) injection 100 mcg, 100 mcg, Intravenous, Q HOUR PRN, Hortensia Silverman, D.N.P., 100 mcg at 06/10/18 2154  •  mag hydrox-al hydrox-simeth (MAALOX PLUS ES or MYLANTA DS) suspension 30 mL, 30 mL, Oral, Q6HRS PRN, Hortensia Silverman, D.N.P.  •  oxytocin (PITOCIN) infusion (for induction), 0.5-20  "bill-units/min, Intravenous, Continuous, ELIZABETH LionN.P.  •  miSOPROStol (CYTOTEC) tablet 800 mcg, 800 mcg, Rectal, Once PRN, ELIZABETH LionN.P.  •  LIDOCAINE HCL (PF) 1 % INJ SOLN, , , ,   •  LIDOCAINE-EPINEPHRINE 1.5 %-1:617512 INJ SOLN, , , ,   •  LIDOCAINE HCL (PF) 1 % INJ SOLN, , , ,     Prenatal care with [facility]PC starting at 21 4/7 week gestation with following problems:  Patient Active Problem List    Diagnosis Date Noted   • Encounter for supervision of normal pregnancy in third trimester 02/07/2018   • Rubella equivocal 12/12/2014               Objective:      Blood pressure 129/69, pulse 78, temperature 36.2 °C (97.2 °F), temperature source Temporal, height 1.626 m (5' 4\"), weight 61.2 kg (135 lb), unknown if currently breastfeeding.    General:   no acute distress   Skin:   normal   HEENT:  PERRLA   Lungs:   CTA bilateral   Heart:   S1, S2 normal   Abdomen:   gravid, NT   EFW:  3200   Pelvis:  adequate with gynecoid pelvis   FHT:  145 BPM   Uterine Size: S=D   Presentations: Breech   Cervix:     Dilation: 6-7 cm    Effacement: 90    Station:  0    Consistency: Soft    Position: Anterior     Lab Review  Lab:   Blood type: O     Recent Results (from the past 5880 hour(s))   POCT Pregnancy    Collection Time: 10/16/17 11:15 AM   Result Value Ref Range    POC Urine Pregnancy Test POSITIVE Negative    Internal Control Positive Positive     Internal Control Negative Negative    POCT IN CLINIC OB SCAN    Collection Time: 01/19/18 10:30 AM   Result Value Ref Range    In Clinic OB Scan       TAS shows a single live IUP 18w6d by fetal biometry good cardiac activity good motion. breech, anterior placenta, no previa, adeq AF JANELL 6/16/2018. no adnexal masses   POCT Urinalysis    Collection Time: 02/07/18  3:54 PM   Result Value Ref Range    POC Color  Negative    POC Appearance  Negative    POC Leukocyte Esterase 1+ Negative    POC Nitrites Neg Negative    POC Urobiligen  Negative (0.2) mg/dL    POC " Protein Trace Negative mg/dL    POC Urine PH 6.0 5.0 - 8.0    POC Blood Trace Negative    POC Specific Gravity 1.030 <1.005 - >1.030    POC Ketones Neg Negative mg/dL    POC Bilirubin  Negative mg/dL    POC Glucose Neg Negative mg/dL   GC DNA PROBE    Collection Time: 02/10/18 12:00 AM   Result Value Ref Range    Gc By Dna Probe negative    CHLAMYDIA DNA PROBE    Collection Time: 02/10/18 12:00 AM   Result Value Ref Range    Chlamydia By Dna Probe negative    ABO AND RH DETERMINATION    Collection Time: 02/14/18 12:00 AM   Result Value Ref Range    Rh Grouping Only POS     ABO Grouping Only O    ANTIBODY SCREEN    Collection Time: 02/14/18 12:00 AM   Result Value Ref Range    Antibody Screen Scrn NEGATIVE    PLATELET COUNT    Collection Time: 02/14/18 12:00 AM   Result Value Ref Range    Platelet Count 215  k/uL    RUBELLA ABS IGG    Collection Time: 02/14/18 12:00 AM   Result Value Ref Range    Rubella IgG Antibody IMMUNE    AFP TETRA    Collection Time: 02/14/18 12:00 AM   Result Value Ref Range    Interpretation NEGATIVE FOR NTD, DS, TRISOMY    RPR (SYPHILIS)    Collection Time: 02/14/18 12:00 AM   Result Value Ref Range    Rapid Plasma Reagin -Rpr- NON RACTIVE    HEP B SURFACE ANTIGEN    Collection Time: 02/14/18 12:00 AM   Result Value Ref Range    Hepatitis B Surface Antigen NON REACTIVE    GLUCOSE 1HR GESTATIONAL    Collection Time: 03/12/18 12:00 AM   Result Value Ref Range    Glucose, Post Dose 147  mG/dL    HCT    Collection Time: 03/12/18 12:00 AM   Result Value Ref Range    Hematocrit 39.5  %    HGB    Collection Time: 03/12/18 12:00 AM   Result Value Ref Range    Hemoglobin 12.9  g/dL    HIV AG/AB COMBO ASSAY SCREENING    Collection Time: 03/12/18 12:00 AM   Result Value Ref Range    HIV 1,0,2 IC NON REACTIVE    T.PALLIDUM AB EIA    Collection Time: 03/12/18 12:00 AM   Result Value Ref Range    Syphilis, Treponemal Qual NON REACTIVE    GLUCOSE TOLERANCE 3 HOUR    Collection Time: 03/18/18 12:00 AM    Result Value Ref Range    Glucose 3 Hour 68, 132, 123, 105 mg/dL    POCT Fetal Nonstress Test    Collection Time: 04/11/18  2:33 PM   Result Value Ref Range    NST Indications Decreased FM     NST Baseline 135     NST Uterine Activity none     NST Acoustic Stimulation none     NST Assessment       reactive tracing positive accels 10x10 and one variabile noted, moderate variability    NST Action Necessary none     NST Other Data none     NST Return none     NST Read By Brendan    GRP B STREP, BY PCR (DIRECT)    Collection Time: 05/18/18 12:00 AM   Result Value Ref Range    Strep Gp B DNA PCR negative    Hold Blood Bank Specimen (Not Tested)    Collection Time: 06/10/18  9:50 PM   Result Value Ref Range    Holding Tube - Bb DONE    CBC WITH DIFFERENTIAL    Collection Time: 06/10/18  9:50 PM   Result Value Ref Range    WBC 11.5 (H) 4.8 - 10.8 K/uL    RBC 4.23 4.20 - 5.40 M/uL    Hemoglobin 12.1 12.0 - 16.0 g/dL    Hematocrit 35.6 (L) 37.0 - 47.0 %    MCV 84.2 81.4 - 97.8 fL    MCH 28.6 27.0 - 33.0 pg    MCHC 34.0 33.6 - 35.0 g/dL    RDW 37.2 35.9 - 50.0 fL    Platelet Count 222 164 - 446 K/uL    MPV 9.0 9.0 - 12.9 fL    Neutrophils-Polys 76.10 (H) 44.00 - 72.00 %    Lymphocytes 17.10 (L) 22.00 - 41.00 %    Monocytes 5.40 0.00 - 13.40 %    Eosinophils 0.30 0.00 - 6.90 %    Basophils 0.30 0.00 - 1.80 %    Immature Granulocytes 0.80 0.00 - 0.90 %    Nucleated RBC 0.00 /100 WBC    Neutrophils (Absolute) 8.75 (H) 2.00 - 7.15 K/uL    Lymphs (Absolute) 1.97 1.00 - 4.80 K/uL    Monos (Absolute) 0.62 0.00 - 0.85 K/uL    Eos (Absolute) 0.03 0.00 - 0.51 K/uL    Baso (Absolute) 0.03 0.00 - 0.12 K/uL    Immature Granulocytes (abs) 0.09 0.00 - 0.11 K/uL    NRBC (Absolute) 0.00 K/uL       Reprint Order Requisition     US-OB 2ND 3RD TRI COMPLETE (Order #[id number]) on 2/26/18   Reviewed By Anna Gomez M.D. on 2/26/2018  3:46 PM   Last Resulted Time   Mon Feb 26, 2018  9:13 AM   Images     Show images for  US-OB 2ND 3RD TRI COMPLETE   Associated Diagnoses     Encounter for supervision of other normal pregnancy in second trimester       Imaging Result Status     Status: Final result (Exam End: 2/26/2018  9:02 AM)   Imaging Previous Results     Open Hard Copy Result Report (Order #[id number] - US-OB 2ND 3RD TRI COMPLETE)   Narrative       2/26/2018 7:57 AM    HISTORY/REASON FOR EXAM:  Evaluate fetal anatomy, late care, alpha-fetoprotein within normal limits    TECHNIQUE/EXAM DESCRIPTION: OB complete ultrasound.    COMPARISON:  None    FINDINGS:  Fetal Lie:  Breech  LMP:  Unknown  Clinical JANELL by LMP:  Not applicable    Placenta (Location):  Anterior  Placenta Previa: No  Placental Grade: I    Amniotic Fluid Volume:  MARY = 14.12 cm    Fetal Heart Rate:  162 bpm    Cervical Length:  3.07 cm   Transabdominal    No maternal adnexal mass is identified.    Umbilical Artery S/D Ratio(s):  Not applicable    Fetal Anatomy  (Seen or Not Seen)  Lateral Ventricles     Seen  Cisterna Magna        Seen  Cerebellum              Seen  CSP             Seen  Orbits             Seen  Face/Lips                Seen  Cord Insertion         Seen  Placental CI         Seen  4 Chamber Heart     Seen  LVOT               Seen  RVOT              Seen  Stomach       Seen  Kidneys                   Seen  Urinary Bladder      Seen  Spine                       Seen  3 Vessel Cord          Seen  Both Upper Extremities    Seen  Both Lower Extremities    Seen  Diaphragm             Seen  Movement       Seen  Gender:  Likely female    Fetal Biometry  BPD    5.82 cm, 23 weeks, 6 days  HC    22.44 cm, 24 weeks, 3 days  AC    18.11 cm, 23 weeks  Femur Length    4.35 cm, 24 weeks, 2 days  Humerus Length    4.01 cm, 24 weeks, 2 days  Cerebellum Diameter   2.79 cm    EGA by this US:  24 weeks  JANELL by this US: 6/18/2018  JANELL by 1st US:  6/16/2018 by MD    Estimated Fetal Weight:  614 grams    Comments:   Impression       1.  Single intrauterine pregnancy of an  estimated gestational age of 24 weeks with an estimated date of delivery of 2018.  2.  Fetal survey within normal limits.    INTERPRETING LOCATION:  [facility], [id number]          Assessment:   Vika Mckeon at 39w1d  Labor status: Active phase labor.  Obstetrical history significant for   Patient Active Problem List    Diagnosis Date Noted   • Encounter for supervision of normal pregnancy in third trimester 2018   • Rubella equivocal 2014   .      Plan:     Admit to L&D, GBS negative. Anticipate .

## 2018-06-11 NOTE — CARE PLAN
Problem: Altered physiologic condition related to immediate post-delivery state and potential for bleeding/hemorrhage  Goal: Patient physiologically stable as evidenced by normal lochia, palpable uterine involution and vital signs within normal limits  Outcome: PROGRESSING AS EXPECTED  Fundus firm, lochia light rubra.    Problem: Alteration in comfort related to episiotomy, vaginal repair and/or after birth pains  Goal: Patient verbalizes acceptable pain level  Outcome: PROGRESSING AS EXPECTED  Reviewed 0-10 pain scale and available pain meds with pt. Pt states she will call for pain meds as needed.

## 2018-06-11 NOTE — PROGRESS NOTES
Pt up from L&D via wheelchair with Yolie SOLORIO. Pt oriented to room, call light, emergency light, skylight. Assessment complete. Fundus firm, lochia light. Call light within reach. Will continue to monitor VS.

## 2018-06-11 NOTE — PROGRESS NOTES
A bedside report received from Priya SOLORIO @ the change of shift.  Assumed care. Discussed plan of care especially on managing pain. Assessment done. Encouraged to call if with needs. Will check at intervals.

## 2018-06-11 NOTE — PROGRESS NOTES
"Post Partum Progress Note    Name:   Vika Mckeon     Date/Time:  6/11/2018 - 6:24 AM  Chief Admitting Dx:  Pregnancy  Indication for care in labor or delivery  Delivery Type:  vaginal, spontaneous  Post-Op/Post Partum Days #:  1    Subjective:  Abdominal pain: yes  Ambulating:   yes  Tolerating liquids:  yes  Tolerating food:  yes common adult  Flatus:   yes  BM:    no  Bleeding:   with a small amount of bleeding  Voiding:   yes  Dizziness:   no  Feeding:   breast    Vitals:    06/10/18 2125 06/11/18 0150   BP: 129/69 118/71   Pulse: 78 68   Resp:  16   Temp: 36.2 °C (97.2 °F) 37.1 °C (98.7 °F)   TempSrc: Temporal    SpO2:  98%   Weight: 61.2 kg (135 lb)    Height: 1.626 m (5' 4\")        Exam:  Breast: Tenderness no, Engorged yes and Lactating yes  Abdomen: Abdomen soft, non-tender. BS normal. No masses,  No organomegaly  Fundal Tenderness:  yes  Fundus Firm: yes  Incision: none  Below umbilicus: yes  Perineum: perineum intact  Lochia: mild  Extremities: Normal extremities, peripheral pulses and reflexes normal    Meds:  Current Facility-Administered Medications   Medication Dose   • LR infusion     • PRN oxytocin (PITOCIN) (20 Units/1000 mL) PRN for excessive uterine bleeding - See Admin Instr  125-999 mL/hr   • miSOPROStol (CYTOTEC) tablet 600 mcg  600 mcg   • docusate sodium (COLACE) capsule 100 mg  100 mg   • bisacodyl (DULCOLAX) suppository 10 mg  10 mg   • magnesium hydroxide (MILK OF MAGNESIA) suspension 30 mL  30 mL   • prenatal plus vitamin (STUARTNATAL 1+1) 27-1 MG tablet 1 Tab  1 Tab   • oxyCODONE-acetaminophen (PERCOCET) 5-325 MG per tablet 1 Tab  1 Tab   • ibuprofen (MOTRIN) tablet 800 mg  800 mg   • LACTATED RINGERS IV SOLN     • ondansetron (ZOFRAN ODT) dispertab 4 mg  4 mg    Or   • ondansetron (ZOFRAN) syringe/vial injection 4 mg  4 mg   • oxytocin (PITOCIN) infusion (for postpartum)  2,000 mL/hr    Followed by   • oxytocin (PITOCIN) infusion (for postpartum)   mL/hr   • " oxyCODONE-acetaminophen (PERCOCET) 5-325 MG per tablet 2 Tab  2 Tab       Labs:   Recent Labs      06/10/18   2150   WBC  11.5*   RBC  4.23   HEMOGLOBIN  12.1   HEMATOCRIT  35.6*   MCV  84.2   MCH  28.6   MCHC  34.0   RDW  37.2   PLATELETCT  222   MPV  9.0       Assessment:  Chief Admitting Dx:  Pregnancy  Indication for care in labor or delivery  Delivery Type:  vaginal, spontaneous  Tubal Ligation:  no    Plan:  Continue routine post partum care.  Late delivery / hold D/C until 6/12/2018    Mandeep Hutson M.D.

## 2018-06-11 NOTE — PROGRESS NOTES
Pt presents to floor w/ c/o UC. JANELL  w/ GA 39.1. NKDA. Pt denies LOF. Pt reports spotting and positive fetal movement. FOB and mom at bedside.      SVE 6-7/100/-2. BBOW. Admit orders received from Hortensia BRENNERM.     CNM at bedside. Pt requests AROM. aROM w/ clear fluid.     Pt calls out and states 'I feel like I need to poop'. CNM requested at bedside.     RN and CNM at bedside. SVE C/+2. Room prepped for delivery. Pick-up-RN requested at bedside,      of viable female. First degree ashley-urethral lac. APGARs 8/9.    2359 spontaneous del of placenta. Normal and intact. Pt performing skin to skin w. Infant.    120 Pt up to bathroom. Stable and alert w. steady gait. Ashley care performed and voided successfully.    135 Pt transferred to PP via wheelchair. FOB at bedside and infant wrapped in blankets in arms.    0145 Bedside report to PP RN. Pt stable and alert, resting comfortably in bed. Pt denies needs at this time. All questions answered. Bands and cuddles checked.

## 2018-06-11 NOTE — DELIVERY NOTE
Delivery note - [facility] 6/10/18    Patient is a 22 yoa G 2 P 1001 at 39 1/7 week gestation. GBS negative. Admit for active labor. One dose of fentanyl for pain management. Became completely dilatated after AROM clear fluid. Pushed effectively and delivered a viable female infant in an JUDY position at 2252. Baby placed immediately on maternal abdomen for drying, suctioning and stimulation. Placenta S&I in a dowell presentation. Fundus massaged to firm, pitocin rapid IV. Bilateral periurethral lacerations repaired with local lidocaine and 3-0 chromic.  cc. Apgars 8&9. Weight pending for maternal skin-to-skin bonding.     Dr. Hutson attending.

## 2018-06-12 VITALS
HEART RATE: 61 BPM | TEMPERATURE: 97.7 F | HEIGHT: 64 IN | DIASTOLIC BLOOD PRESSURE: 65 MMHG | RESPIRATION RATE: 18 BRPM | OXYGEN SATURATION: 95 % | WEIGHT: 135 LBS | SYSTOLIC BLOOD PRESSURE: 111 MMHG | BODY MASS INDEX: 23.05 KG/M2

## 2018-06-12 PROCEDURE — 700112 HCHG RX REV CODE 229: Performed by: NURSE PRACTITIONER

## 2018-06-12 PROCEDURE — 700102 HCHG RX REV CODE 250 W/ 637 OVERRIDE(OP): Performed by: NURSE PRACTITIONER

## 2018-06-12 PROCEDURE — A9270 NON-COVERED ITEM OR SERVICE: HCPCS | Performed by: NURSE PRACTITIONER

## 2018-06-12 RX ORDER — IBUPROFEN 800 MG/1
800 TABLET ORAL EVERY 6 HOURS PRN
Qty: 30 TAB | Refills: 0 | Status: SHIPPED | OUTPATIENT
Start: 2018-06-12 | End: 2018-10-18

## 2018-06-12 RX ADMIN — DOCUSATE SODIUM 100 MG: 100 CAPSULE ORAL at 10:28

## 2018-06-12 RX ADMIN — OXYCODONE HYDROCHLORIDE AND ACETAMINOPHEN 1 TABLET: 5; 325 TABLET ORAL at 10:28

## 2018-06-12 ASSESSMENT — PAIN SCALES - GENERAL
PAINLEVEL_OUTOF10: 0
PAINLEVEL_OUTOF10: 0
PAINLEVEL_OUTOF10: 2
PAINLEVEL_OUTOF10: 6

## 2018-06-12 NOTE — DISCHARGE INSTRUCTIONS
POSTPARTUM DISCHARGE INSTRUCTIONS FOR MOM    YOB: 1995   Age: 22 y.o.               Admit Date: 6/10/2018     Discharge Date: 6/12/2018  Attending Doctor:  Mandeep Hutson M.D.                  Allergies:  Nkda [no known drug allergy]    Discharged to home by car. Discharged via wheelchair, hospital escort: Yes.  Special equipment needed: Not Applicable  Belongings with: Personal  Be sure to schedule a follow-up appointment with your primary care doctor or any specialists as instructed.     Discharge Plan:   Diet Plan: Discussed  Activity Level: Discussed  Confirmed Follow up Appointment: Patient to Call and Schedule Appointment  Confirmed Symptoms Management: Discussed  Medication Reconciliation Updated: Yes    REASONS TO CALL YOUR OBSTETRICIAN:  1.   Persistent fever or shaking chills (Temperature higher than 100.4)  2.   Heavy bleeding (soaking more than 1 pad per hour); Passing clots  3.   Foul odor from vagina  4.   Mastitis (Breast infection; breast pain, chills, fever, redness)  5.   Urinary pain, burning or frequency  6.   Episiotomy infection  7.   Severe depression longer than 24 hours    HAND WASHING  · Prior to handling the baby.  · Before breastfeeding or bottle feeding baby.  · After using the bathroom or changing the baby's diaper.    VAGINAL CARE  · Nothing inside vagina for 6 weeks: no sexual intercourse, tampons or douching.  · Bleeding may continue for 2-4 weeks.  Amount may vary.    · Call your physician for heavy bleeding which means soaking more than 1 pad per hour    BIRTH CONTROL  · It is possible to become pregnant at any time after delivery and while breastfeeding.  · Plan to discuss a method of birth control with your physician at your follow up visit. visit.    DIET AND ELIMINATION  · Eating more fiber (bran cereal, fruits, and vegetables) and drinking plenty of fluids will help to avoid constipation.  · Urinary frequency after childbirth is normal.    POSTPARTUM BLUES  During  "the first few days after birth, you may experience a sense of the \"blues\" which may include impatience, irritability or even crying.  These feeling come and go quickly.  However, as many as 1 in 10 women experience emotional symptoms known as postpartum depression.    Postpartum depression:  May start as early as the second or third day after delivery or take several weeks or months to develop.  Symptoms of \"blues\" are present, but are more intense:  Crying spells; loss of appetite; feelings of hopelessness or loss of control; fear of touching the baby; over concern or no concern at all about the baby; little or no concern about your own appearance/caring for yourself; and/or inability to sleep or excessive sleeping.  Contact your physician if you are experiencing any of these symptoms.    Crisis Hotline:  · [place] Crisis Hotline:  9-655-SSQTFFZ  Or 1-470.882.4787  · [place] Crisis Hotline:  1-785.134.4314  Or 925-844-6048    PREVENTING SHAKEN BABY:  If you are angry or stressed, PUT THE BABY IN THE CRIB, step away, take some deep breaths, and wait until you are calm to care for the baby.  DO NOT SHAKE THE BABY.  You are not alone, call a supporter for help.    · Crisis Call Center 24/7 crisis line 014-397-3460 or 1-594.894.9510  · You can also text them, text \"ANSWER\" to 929191    QUIT SMOKING/TOBACCO USE:  I understand the use of any tobacco products increases my chance of suffering from future heart disease and could cause other illnesses which may shorten my life. Quitting the use of tobacco products is the single most important thing I can do to improve my health. For further information on smoking / tobacco cessation call a Toll Free Quit Line at 1-607.398.5594 (*[employer]) or 1-164.165.5840 (American [employer] Association) or you can access the web based program at www.lungusa.org.    · [place] Tobacco Users Help Line:  (440) 185-5489       Toll Free: 1-510.653.9159  · Quit Tobacco Program [facility] (798)025-4018    DEPRESSION / SUICIDE RISK:  As you are discharged from this [facility] facility, it is important to learn how to keep safe from harming yourself.    Recognize the warning signs:  · Abrupt changes in personality, positive or negative- including increase in energy   · Giving away possessions  · Change in eating patterns- significant weight changes-  positive or negative  · Change in sleeping patterns- unable to sleep or sleeping all the time   · Unwillingness or inability to communicate  · Depression  · Unusual sadness, discouragement and loneliness  · Talk of wanting to die  · Neglect of personal appearance   · Rebelliousness- reckless behavior  · Withdrawal from people/activities they love  · Confusion- inability to concentrate     If you or a loved one observes any of these behaviors or has concerns about self-harm, here's what you can do:  · Talk about it- your feelings and reasons for harming yourself  · Remove any means that you might use to hurt yourself (examples: pills, rope, extension cords, firearm)  · Get professional help from the community (Mental Health, Substance Abuse, psychological counseling)  · Do not be alone:Call your Safe Contact- someone whom you trust who will be there for you.  · Call your local CRISIS HOTLINE 439-6820 or 919-043-9537  · Call your local Children's Mobile Crisis Response Team [place] (158) 397-7059 or www.[employer] (acquired by [employer] in 2014)  · Call the toll free National Suicide Prevention Hotlines   · National Suicide Prevention Lifeline 493-655-QJHS (8159)  · National Hope Line Network 800-SUICIDE (864-6862)    DISCHARGE SURVEY:  Thank you for choosing [facility].  We hope we provided you with very good care.  You may be receiving a survey in the mail.  Please fill it out.  Your opinion is valuable to us.    ADDITIONAL EDUCATIONAL MATERIALS GIVEN TO PATIENT:        My signature on this form indicates that:  1.  I have reviewed and  understand the above information  2.  My questions regarding this information have been answered to my satisfaction.  3.  I have formulated a plan with my discharge nurse to obtain my prescribed medication for home.

## 2018-06-12 NOTE — PROGRESS NOTES
Discharge instruction for mom and baby discussed. Prescription given and explained. Questions and concerns have been answered.

## 2018-06-12 NOTE — CARE PLAN
Problem: Potential knowledge deficit related to lack of understanding of self and  care  Goal: Patient will verbalize understanding of self and infant care  Outcome: PROGRESSING AS EXPECTED  Mom has been holding baby and attending to her when she cries.  Mom has been feeding baby every 2-3 hours.     Problem: Potential anxiety related to difficulty adapting to parental role  Goal: Patient will verbalize and demonstrate effective bonding and parenting behavior  Outcome: PROGRESSING AS EXPECTED  Patient demonstrated knowledge in care for herself and infant with feeding, changing diaper, and comforting.

## 2018-06-12 NOTE — PROGRESS NOTES
Received report from Amira SOLORIO.  Assumed patient care. Assessment complete. Pt was c/o pain at abdomen, stating that cramping was very strong; percocet given. Will continue postpartum care.

## 2018-06-12 NOTE — DISCHARGE SUMMARY
Discharge Summary:      Vika Mckeon      Admit Date:   6/10/2018  Discharge Date:  2018     Admitting diagnosis:  Pregnancy  Indication for care in labor or delivery  Discharge Diagnosis: Status post vaginal, spontaneous.  Pregnancy Complications: none  Tubal Ligation:  no        History:  History reviewed. No pertinent past medical history.  OB History    Para Term  AB Living   2 1 1     1   SAB TAB Ectopic Molar Multiple Live Births             1      # Outcome Date GA Lbr Harish/2nd Weight Sex Delivery Anes PTL Lv   2 Current            1 Term 05/16/15 39w2d  3.11 kg (6 lb 13.7 oz) F Vag-Spont   SOWMYA      Birth Comments: No complications            Nkda [no known drug allergy]  Patient Active Problem List    Diagnosis Date Noted   • Encounter for supervision of normal pregnancy in third trimester 2018   • Rubella equivocal 2014        Hospital Course:   22 y.o. , now para 2, was admitted with the above mentioned diagnosis, underwent Active Labor, vaginal, spontaneous. Patient postpartum course was unremarkable, with progressive advancement in diet , ambulation and toleration of oral analgesia. Patient without complaints today and desires discharge.      Vitals:    18 0150 18 0410 18 0800 18   BP: 118/71 (!) 94/66 111/57 115/62   Pulse: 68 (!) 59 61 66   Resp: 16    Temp: 37.1 °C (98.7 °F) 37 °C (98.6 °F) 37.2 °C (98.9 °F) 36.8 °C (98.2 °F)   TempSrc:       SpO2: 98% 97% 96% 93%   Weight:       Height:           Current Facility-Administered Medications   Medication Dose   • LR infusion     • PRN oxytocin (PITOCIN) (20 Units/1000 mL) PRN for excessive uterine bleeding - See Admin Instr  125-999 mL/hr   • miSOPROStol (CYTOTEC) tablet 600 mcg  600 mcg   • docusate sodium (COLACE) capsule 100 mg  100 mg   • bisacodyl (DULCOLAX) suppository 10 mg  10 mg   • magnesium hydroxide (MILK OF MAGNESIA) suspension 30 mL  30 mL   • prenatal plus  vitamin (STUARTNATAL 1+1) 27-1 MG tablet 1 Tab  1 Tab   • oxyCODONE-acetaminophen (PERCOCET) 5-325 MG per tablet 1 Tab  1 Tab   • ibuprofen (MOTRIN) tablet 800 mg  800 mg   • ondansetron (ZOFRAN ODT) dispertab 4 mg  4 mg    Or   • ondansetron (ZOFRAN) syringe/vial injection 4 mg  4 mg   • oxytocin (PITOCIN) infusion (for postpartum)   mL/hr   • oxyCODONE-acetaminophen (PERCOCET) 5-325 MG per tablet 2 Tab  2 Tab       Exam:  Breast Exam: negative  Abdomen: Abdomen soft, non-tender. BS normal. No masses,  No organomegaly  Fundus Non Tender: yes  Incision: none  Perineum: perineum intact  Extremity: extremities, peripheral pulses and reflexes normal     Labs:  Recent Labs      06/10/18   2150  06/11/18   0815   WBC  11.5*  12.8*   RBC  4.23  4.50   HEMOGLOBIN  12.1  12.5   HEMATOCRIT  35.6*  38.9   MCV  84.2  86.4   MCH  28.6  27.8   MCHC  34.0  32.1*   RDW  37.2  39.2   PLATELETCT  222  173   MPV  9.0  9.0        Activity:   Discharge to home  Pelvic Rest x 6 weeks    Assessment:  normal postpartum course  Discharge Assessment: No areas of skin breakdown/redness; surgical incision intact/healing     Follow up: [facility] or [facility] in 5 weeks for vaginal.      Discharge Meds:   Current Outpatient Prescriptions   Medication Sig Dispense Refill   • ibuprofen (MOTRIN) 800 MG Tab Take 1 Tab by mouth every 6 hours as needed. 30 Tab 0       Madan Guzmán, PKathieA.

## 2018-06-12 NOTE — PROGRESS NOTES
A bedside report received from Tiffani SOLORIO @ the change of shift.  Assumed care. Discussed plan of care especially on managing pain. Assessment done. Encouraged to call if with needs. Will check at intervals.

## 2018-07-17 ENCOUNTER — POST PARTUM (OUTPATIENT)
Dept: OBGYN | Facility: CLINIC | Age: 23
End: 2018-07-17
Payer: MEDICAID

## 2018-07-17 VITALS — WEIGHT: 112 LBS | BODY MASS INDEX: 19.22 KG/M2 | SYSTOLIC BLOOD PRESSURE: 98 MMHG | DIASTOLIC BLOOD PRESSURE: 62 MMHG

## 2018-07-17 PROBLEM — Z34.93 ENCOUNTER FOR SUPERVISION OF NORMAL PREGNANCY IN THIRD TRIMESTER: Status: RESOLVED | Noted: 2018-02-07 | Resolved: 2018-07-17

## 2018-07-17 PROCEDURE — 90050 PR POSTPARTUM VISIT: CPT | Performed by: NURSE PRACTITIONER

## 2018-07-17 ASSESSMENT — ENCOUNTER SYMPTOMS
CARDIOVASCULAR NEGATIVE: 1
PSYCHIATRIC NEGATIVE: 1
RESPIRATORY NEGATIVE: 1
CONSTITUTIONAL NEGATIVE: 1
MUSCULOSKELETAL NEGATIVE: 1
GASTROINTESTINAL NEGATIVE: 1
NEUROLOGICAL NEGATIVE: 1
EYES NEGATIVE: 1

## 2018-07-17 NOTE — PROGRESS NOTES
Subjective:      Vika Mckeon is a 22 y.o.  female who presents for her postpartum exam. She had a  without complication. Her prenatal course was uncomplicated. She denies dysuria, vaginal bleeding, odor, itching or breast problems. She is breastfeeding. She desires a Nexplanon for her birth control method. Reports no sex prior to this appointment.  Denies any S/S of PP depression.    Assessment   Assessment:    1. PP care of lactating women   2. Exam WNL   3. Pap done on , no results  4. Desires contraception     Patient Active Problem List    Diagnosis Date Noted   • Encounter for postpartum care of lactating mother 2018   • Rubella equivocal 2014       Plan   Plan:    1. Breastfeeding support   2. Continue PNV   3. Contraceptive counseling - follow up w [facility]  4. Encouraged condom use until BCM established  5. Discussed diet, exercise and resumption of sexual activity   6. Gave copy of pap   7.  F/u c PCP or [facility] as needed for primary care needs.           HPI    Review of Systems   Constitutional: Negative.    HENT: Negative.    Eyes: Negative.    Respiratory: Negative.    Cardiovascular: Negative.    Gastrointestinal: Negative.    Genitourinary: Negative.    Musculoskeletal: Negative.    Skin: Negative.    Neurological: Negative.    Endo/Heme/Allergies: Negative.    Psychiatric/Behavioral: Negative.           Objective:     BP (!) 98/62   Wt 50.8 kg (112 lb)   BMI 19.22 kg/m²      Physical Exam   Constitutional: She is oriented to person, place, and time. She appears well-developed and well-nourished.   Pulmonary/Chest: Effort normal.   Abdominal: Soft.   Genitourinary: Vagina normal and uterus normal. Rectal exam shows no tenderness. Pelvic exam was performed with patient supine. No labial fusion. There is no rash, tenderness, lesion or injury on the right labia. There is no rash, tenderness, lesion or injury on the left labia. No erythema, tenderness or bleeding in the  vagina. No signs of injury around the vagina. No vaginal discharge found.   Neurological: She is alert and oriented to person, place, and time.   Skin: Skin is warm and dry.   Psychiatric: She has a normal mood and affect. Her behavior is normal. Judgment and thought content normal.               Assessment/Plan:     1. Encounter for postpartum care of lactating mother  -follow up for Nexplanon implant

## 2018-07-17 NOTE — PROGRESS NOTES
Pt here today for postpartum exam.  Delivery Date 6/10/18   Currently: breast feeding   BCM: Pt would like information on the explanon, [facility].   Good ph:[phone]  Pt states no complaints

## 2018-10-18 ENCOUNTER — OFFICE VISIT (OUTPATIENT)
Dept: MEDICAL GROUP | Facility: MEDICAL CENTER | Age: 23
End: 2018-10-18
Attending: FAMILY MEDICINE
Payer: MEDICAID

## 2018-10-18 VITALS
SYSTOLIC BLOOD PRESSURE: 122 MMHG | HEIGHT: 63 IN | OXYGEN SATURATION: 99 % | RESPIRATION RATE: 16 BRPM | BODY MASS INDEX: 19.49 KG/M2 | TEMPERATURE: 97.8 F | HEART RATE: 80 BPM | WEIGHT: 110 LBS | DIASTOLIC BLOOD PRESSURE: 80 MMHG

## 2018-10-18 DIAGNOSIS — H92.02 EAR PAIN, LEFT: ICD-10-CM

## 2018-10-18 DIAGNOSIS — Z30.019 ENCOUNTER FOR INITIAL PRESCRIPTION OF CONTRACEPTIVES, UNSPECIFIED CONTRACEPTIVE: ICD-10-CM

## 2018-10-18 LAB
INT CON NEG: NEGATIVE
INT CON POS: POSITIVE
POC URINE PREGNANCY TEST: NEGATIVE

## 2018-10-18 PROCEDURE — 96372 THER/PROPH/DIAG INJ SC/IM: CPT | Performed by: FAMILY MEDICINE

## 2018-10-18 PROCEDURE — 99203 OFFICE O/P NEW LOW 30 MIN: CPT | Mod: 25 | Performed by: FAMILY MEDICINE

## 2018-10-18 PROCEDURE — 81025 URINE PREGNANCY TEST: CPT | Performed by: FAMILY MEDICINE

## 2018-10-18 PROCEDURE — 99203 OFFICE O/P NEW LOW 30 MIN: CPT | Performed by: FAMILY MEDICINE

## 2018-10-18 RX ORDER — IBUPROFEN 400 MG/1
400 TABLET ORAL 3 TIMES DAILY
Qty: 30 TAB | Refills: 0 | Status: SHIPPED | OUTPATIENT
Start: 2018-10-18 | End: 2022-03-28

## 2018-10-18 RX ORDER — MEDROXYPROGESTERONE ACETATE 150 MG/ML
150 INJECTION, SUSPENSION INTRAMUSCULAR ONCE
Qty: 1 VIAL | Refills: 0 | Status: SHIPPED | OUTPATIENT
Start: 2018-10-18 | End: 2018-10-18 | Stop reason: SDUPTHER

## 2018-10-18 RX ORDER — MEDROXYPROGESTERONE ACETATE 150 MG/ML
150 INJECTION, SUSPENSION INTRAMUSCULAR ONCE
Qty: 1 VIAL | Refills: 0 | Status: SHIPPED | OUTPATIENT
Start: 2018-10-18 | End: 2018-10-18

## 2018-10-18 NOTE — PROGRESS NOTES
CC: Here to establish care, concerns about left ear pain and discomfort    HPI: New patient  Vika presents today  to establish care, 23 years old female with no significant past medical history reviewed past medical problems. Past surgical history, family/social history and medications. She lives with her partner and 2 kids, works in a part-time job. Has been living in [place], [place]. All her life, discussed the following concerns. Follow today:       Ear pain, left   Reports having this pain on the left ear and discomfort for the past 4 days. She said it started the first day with some discomfort and she continued to flush her ear with hot water to see if that can help the pain and discomfort did not resolve. Denies sore throat or fever or other upper respiratory tract symptoms. Denies ear discharge, she said she has been manipulating the ear with Q-tips sometimes and hot water. Denies hearing problem or hearing loss   Encounter for initial prescription of contraceptives, unspecified contraceptive   Patient would like to start contraception. She delivered her daughter 4 months ago, she has been using condoms for prevention of pregnancy and she said she is being very serious about that and she is very sure that she never had any unprotected sex since she delivered the baby, in the past she has tried the oral contraceptive pills, but she is conservative to start it again because she got pregnant with her daughter on OCPs. Patient would like to try the injection. [facility]. Did not have menstruation since she delivered the baby. She was breast-feeding and she stopped breast-feeding last month, denies vaginal spotting or lower abdominal pain or other symptoms and signs suggestive of pregnancy      Patient Active Problem List    Diagnosis Date Noted   • Encounter for postpartum care of lactating mother 07/17/2018   • Rubella equivocal 12/12/2014       Current Outpatient  Prescriptions   Medication Sig Dispense Refill   • ibuprofen (MOTRIN) 400 MG Tab Take 1 Tab by mouth 3 times a day. 30 Tab 0     No current facility-administered medications for this visit.          Allergies as of 10/18/2018 - Reviewed 10/18/2018   Allergen Reaction Noted   • Nkda [no known drug allergy]  08/27/2007        ROS: Denies any chest pain, Shortness of breath, Changes bowel or bladder, Lower extremity edema.    Physical Exam:  Gen.: Well-developed, well-nourished, no apparent distress,pleasant and cooperative with the examination  Skin:  Warm and dry with good turgor. No rashes or suspicious lesions in visible areas  Eye: PERRLA, conjunctiva and sclera clear, lids normal  HEENT: Normocephalic/atraumatic, sinuses nontender with palpation, TMs clear, nares patent with pink mucosa and clear rhinorrhea, lips without lesions, oropharynx clear.  Neck: Trachea midline,no masses or adenopathy  Thyroid: normal consistency and size. No masses or nodules. Not tender with palpation.  Cor: Regular rate and rhythm without murmur, gallop or rub.  Lungs: Respirations unlabored.Clear to auscultation with equal breath sounds bilaterally. No wheezes, rhonchi.  Abdomen: Soft nontender without hepatosplenomegaly or masses appreciated, normoactive bowel sounds. No hernias.  Extremities: No cyanosis, clubbing or edema, Symmetrical without deformities or malformations. Pulses 2+ and symmetrical both upper and lower extremities  Lymphatic: No abnormal adenopathy of the neck groin or axillae.  Psych: Alert and oriented x 3.Normal, judgement,insight and memory.        Assessment and Plan.   23 y.o. female here to establish care    1. Ear pain, left  , Most likely this is a viral or traumatic discomfort or pain related to her manipulation because the clinical exam is completely negative for evidence of infection or ear erythema or eardrum abnormality, advise supportive care. 2. Use Motrin every 8 hours with food as needed and if it  does not resolve to come back for reassessment. No need for antibiotics at this time  - ibuprofen (MOTRIN) 400 MG Tab; Take 1 Tab by mouth 3 times a day.  Dispense: 30 Tab; Refill: 0  2. Encounter for initial prescription of contraceptives, unspecified contraceptive  Pregnancy test at the office today is negative  - POCT Pregnancy  -Patient was given medroxyprogesterone injection today for contraception.      Please note that this dictation was created using voice recognition software. I have made every reasonable attempt to correct obvious errors but there may be errors of grammar and content that I may have overlooked prior to finalization of this note.

## 2018-10-19 PROCEDURE — 700111 HCHG RX REV CODE 636 W/ 250 OVERRIDE (IP): Performed by: FAMILY MEDICINE

## 2018-10-19 RX ORDER — MEDROXYPROGESTERONE ACETATE 150 MG/ML
150 INJECTION, SUSPENSION INTRAMUSCULAR ONCE
Status: COMPLETED | OUTPATIENT
Start: 2018-10-19 | End: 2018-10-19

## 2018-10-19 RX ADMIN — MEDROXYPROGESTERONE ACETATE 150 MG: 150 INJECTION, SUSPENSION INTRAMUSCULAR at 13:28

## 2018-10-19 NOTE — PATIENT INSTRUCTIONS
History   Sexual Activity   • Sexual activity: Yes   • Partners: Male   • Birth control/ protection: Condom     Comment: none

## 2018-12-20 ENCOUNTER — APPOINTMENT (OUTPATIENT)
Dept: RADIOLOGY | Facility: MEDICAL CENTER | Age: 23
End: 2018-12-20
Attending: EMERGENCY MEDICINE
Payer: MEDICAID

## 2018-12-20 ENCOUNTER — HOSPITAL ENCOUNTER (EMERGENCY)
Facility: MEDICAL CENTER | Age: 23
End: 2018-12-21
Attending: EMERGENCY MEDICINE
Payer: MEDICAID

## 2018-12-20 DIAGNOSIS — N76.0 BACTERIAL VAGINOSIS: ICD-10-CM

## 2018-12-20 DIAGNOSIS — N93.8 DYSFUNCTIONAL UTERINE BLEEDING: ICD-10-CM

## 2018-12-20 DIAGNOSIS — B96.89 BACTERIAL VAGINOSIS: ICD-10-CM

## 2018-12-20 DIAGNOSIS — R10.32 LLQ ABDOMINAL PAIN: ICD-10-CM

## 2018-12-20 LAB
ANION GAP SERPL CALC-SCNC: 8 MMOL/L (ref 0–11.9)
APPEARANCE UR: ABNORMAL
BACTERIA #/AREA URNS HPF: NEGATIVE /HPF
BACTERIA GENITAL QL WET PREP: NORMAL
BASOPHILS # BLD AUTO: 0.7 % (ref 0–1.8)
BASOPHILS # BLD: 0.05 K/UL (ref 0–0.12)
BILIRUB UR QL STRIP.AUTO: NEGATIVE
BUN SERPL-MCNC: 17 MG/DL (ref 8–22)
CALCIUM SERPL-MCNC: 9.6 MG/DL (ref 8.5–10.5)
CHLORIDE SERPL-SCNC: 108 MMOL/L (ref 96–112)
CO2 SERPL-SCNC: 25 MMOL/L (ref 20–33)
COLOR UR: YELLOW
CREAT SERPL-MCNC: 0.76 MG/DL (ref 0.5–1.4)
EOSINOPHIL # BLD AUTO: 0.17 K/UL (ref 0–0.51)
EOSINOPHIL NFR BLD: 2.4 % (ref 0–6.9)
EPI CELLS #/AREA URNS HPF: NEGATIVE /HPF
ERYTHROCYTE [DISTWIDTH] IN BLOOD BY AUTOMATED COUNT: 40.3 FL (ref 35.9–50)
GLUCOSE SERPL-MCNC: 93 MG/DL (ref 65–99)
GLUCOSE UR STRIP.AUTO-MCNC: NEGATIVE MG/DL
HCG SERPL QL: NEGATIVE
HCT VFR BLD AUTO: 43.6 % (ref 37–47)
HGB BLD-MCNC: 14.8 G/DL (ref 12–16)
HYALINE CASTS #/AREA URNS LPF: ABNORMAL /LPF
IMM GRANULOCYTES # BLD AUTO: 0.01 K/UL (ref 0–0.11)
IMM GRANULOCYTES NFR BLD AUTO: 0.1 % (ref 0–0.9)
KETONES UR STRIP.AUTO-MCNC: NEGATIVE MG/DL
LEUKOCYTE ESTERASE UR QL STRIP.AUTO: NEGATIVE
LYMPHOCYTES # BLD AUTO: 2.01 K/UL (ref 1–4.8)
LYMPHOCYTES NFR BLD: 28.2 % (ref 22–41)
MCH RBC QN AUTO: 29.7 PG (ref 27–33)
MCHC RBC AUTO-ENTMCNC: 33.9 G/DL (ref 33.6–35)
MCV RBC AUTO: 87.6 FL (ref 81.4–97.8)
MICRO URNS: ABNORMAL
MONOCYTES # BLD AUTO: 0.55 K/UL (ref 0–0.85)
MONOCYTES NFR BLD AUTO: 7.7 % (ref 0–13.4)
NEUTROPHILS # BLD AUTO: 4.33 K/UL (ref 2–7.15)
NEUTROPHILS NFR BLD: 60.9 % (ref 44–72)
NITRITE UR QL STRIP.AUTO: NEGATIVE
NRBC # BLD AUTO: 0 K/UL
NRBC BLD-RTO: 0 /100 WBC
PH UR STRIP.AUTO: 8 [PH]
PLATELET # BLD AUTO: 219 K/UL (ref 164–446)
PMV BLD AUTO: 9 FL (ref 9–12.9)
POTASSIUM SERPL-SCNC: 3.7 MMOL/L (ref 3.6–5.5)
PROT UR QL STRIP: NEGATIVE MG/DL
RBC # BLD AUTO: 4.98 M/UL (ref 4.2–5.4)
RBC # URNS HPF: ABNORMAL /HPF
RBC UR QL AUTO: ABNORMAL
SIGNIFICANT IND 70042: NORMAL
SITE SITE: NORMAL
SODIUM SERPL-SCNC: 141 MMOL/L (ref 135–145)
SOURCE SOURCE: NORMAL
SP GR UR STRIP.AUTO: 1.02
UROBILINOGEN UR STRIP.AUTO-MCNC: 1 MG/DL
WBC # BLD AUTO: 7.1 K/UL (ref 4.8–10.8)
WBC #/AREA URNS HPF: ABNORMAL /HPF

## 2018-12-20 PROCEDURE — 87591 N.GONORRHOEAE DNA AMP PROB: CPT

## 2018-12-20 PROCEDURE — 700111 HCHG RX REV CODE 636 W/ 250 OVERRIDE (IP): Performed by: EMERGENCY MEDICINE

## 2018-12-20 PROCEDURE — 99285 EMERGENCY DEPT VISIT HI MDM: CPT

## 2018-12-20 PROCEDURE — 84703 CHORIONIC GONADOTROPIN ASSAY: CPT

## 2018-12-20 PROCEDURE — 96374 THER/PROPH/DIAG INJ IV PUSH: CPT

## 2018-12-20 PROCEDURE — 87491 CHLMYD TRACH DNA AMP PROBE: CPT

## 2018-12-20 PROCEDURE — 81001 URINALYSIS AUTO W/SCOPE: CPT

## 2018-12-20 PROCEDURE — 80048 BASIC METABOLIC PNL TOTAL CA: CPT

## 2018-12-20 PROCEDURE — 85025 COMPLETE CBC W/AUTO DIFF WBC: CPT

## 2018-12-20 RX ORDER — KETOROLAC TROMETHAMINE 30 MG/ML
15 INJECTION, SOLUTION INTRAMUSCULAR; INTRAVENOUS ONCE
Status: COMPLETED | OUTPATIENT
Start: 2018-12-20 | End: 2018-12-20

## 2018-12-20 RX ORDER — ACETAMINOPHEN 325 MG/1
500 TABLET ORAL EVERY 4 HOURS PRN
Status: SHIPPED | COMMUNITY
End: 2022-03-28

## 2018-12-20 RX ADMIN — KETOROLAC TROMETHAMINE 15 MG: 30 INJECTION, SOLUTION INTRAMUSCULAR at 23:14

## 2018-12-20 ASSESSMENT — PAIN DESCRIPTION - DESCRIPTORS: DESCRIPTORS: ACHING;TENDER

## 2018-12-20 ASSESSMENT — PAIN SCALES - GENERAL: PAINLEVEL_OUTOF10: 4

## 2018-12-21 VITALS
BODY MASS INDEX: 19.84 KG/M2 | SYSTOLIC BLOOD PRESSURE: 116 MMHG | WEIGHT: 111.99 LBS | OXYGEN SATURATION: 98 % | HEART RATE: 75 BPM | DIASTOLIC BLOOD PRESSURE: 56 MMHG | RESPIRATION RATE: 16 BRPM | HEIGHT: 63 IN | TEMPERATURE: 97.6 F

## 2018-12-21 LAB
C TRACH DNA SPEC QL NAA+PROBE: NEGATIVE
N GONORRHOEA DNA SPEC QL NAA+PROBE: NEGATIVE
SPECIMEN SOURCE: NORMAL

## 2018-12-21 PROCEDURE — 76856 US EXAM PELVIC COMPLETE: CPT

## 2018-12-21 RX ORDER — METRONIDAZOLE 500 MG/1
500 TABLET ORAL 2 TIMES DAILY
Qty: 14 TAB | Refills: 0 | Status: SHIPPED | OUTPATIENT
Start: 2018-12-21 | End: 2018-12-28

## 2018-12-21 NOTE — ED PROVIDER NOTES
"ED Provider Note    Scribed for Venus Womack M.D. by Carlos Flores. 2018  10:47 PM    Means of arrival: Walk In  History obtained from: Patient  History limited by: None      CHIEF COMPLAINT  Chief Complaint   Patient presents with   • Abdominal Pain     Pt reports abdominal pain that started yesterday along with severe bloating.  Pain in central lower portion of abdomen and worsens when she needs to urinate.  Reports normal BM this AM.  She is postpartum x 6 months, and reposrt she started her period \"1 month go and I am still bleding with it.\".       HPI  Vika Mckeon is a 23 y.o. otherwise healthy female female who presents to the Emergency Department for evaluation of severe lower abdominal pain which she states is worse on the left side, onset yesterday. The patient describes the pain as feeling sharp without significant radiation. She endorses experiencing associated nausea and notes that the pain is exacerbated right before she has to urinate. The patient also notes that she delivered her second baby 6 months ago and last month was the first time she has had her menstrual period since then. Per patient, her menstrual period has been a constant flow since it started one month ago. She states that there is no chance she could be pregnant as she is on the birth control shot. She denies any previous abdominal surgeries, vomiting, dysuria, blood in urine, and vaginal discharge.  No concern for sexually transmitted diseases as patient is in a monogamous relationship.    REVIEW OF SYSTEMS  Pertinent positive include lower abdominal pain, nausea, and vaginal bleeding . Pertinent negative include no abdominal surgeries, vomiting, dysuria, blood in urine, or vaginal discharge. All other systems reviewed and are negative.    PAST MEDICAL HISTORY   Pregnancy history of     SOCIAL HISTORY  Social History     Social History Main Topics   • Smoking status: Never Smoker   • Smokeless tobacco: Never " "Used   • Alcohol use No   • Drug use: No   • Sexual activity: Yes     Partners: Male     Birth control/ protection: Condom      Comment: none        SURGICAL HISTORY  patient denies any surgical history    CURRENT MEDICATIONS  Home Medications     Reviewed by Nikia Wall R.N. (Registered Nurse) on 12/20/18 at 2216  Med List Status: Partial   Medication Last Dose Status   acetaminophen (TYLENOL) 325 MG Tab 12/20/2018 Active   ibuprofen (MOTRIN) 400 MG Tab not taking Active                ALLERGIES  Allergies   Allergen Reactions   • Nkda [No Known Drug Allergy]        PHYSICAL EXAM   VITAL SIGNS: /76   Pulse 88   Temp 36.4 °C (97.6 °F) (Temporal)   Resp 16   Ht 1.6 m (5' 3\")   Wt 50.8 kg (111 lb 15.9 oz)   SpO2 99%   BMI 19.84 kg/m²      Constitutional: Well-appearing young female. Alert in no apparent distress.  HENT: Normocephalic, Atraumatic. Bilateral external ears normal. Nose normal.  Moist mucous membranes.  Oropharynx clear.  Eyes: Pupils are equal and reactive. Conjunctiva normal.   Neck: Supple, full range of motion  Heart: Regular rate and rhythm.  No murmurs.    Lungs: No respiratory distress, normal work of breathing. Lungs clear to auscultation bilaterally.  Abdomen Soft, no distention.  Suprapubic and LLQ tenderness. No rebound or guarding.  Pelvic: Small amount of blood in the vaginal vault.  No evidence of cervicitis, CMT.  Patient does have left-sided adnexal tenderness to palpation in addition to suprapubic tenderness to palpation.  Musculoskeletal: Atraumatic. No obvious deformities noted.  No lower extremity edema.  Skin: Warm, Dry.  No erythema, No rash.   Neurologic: Alert and oriented x3. Moving all extremities spontaneously without focal deficits.  Psychiatric: Affect normal, Mood normal, Appears appropriate and not intoxicated.      DIAGNOSTIC STUDIES    LABS  Personally reviewed by me  Labs Reviewed   URINALYSIS,CULTURE IF INDICATED - Abnormal; Notable for the following: " "       Result Value    Character Turbid (*)     Occult Blood Small (*)     All other components within normal limits   URINE MICROSCOPIC (W/UA) - Abnormal; Notable for the following:     RBC 5-10 (*)     All other components within normal limits   HCG QUAL SERUM   CBC WITH DIFFERENTIAL   BASIC METABOLIC PANEL   WET PREP   CHLAMYDIA/GC PCR URINE OR SWAB   ESTIMATED GFR       RADIOLOGY  Personally reviewed by me  US-PELVIC COMPLETE (TRANSABDOMINAL/TRANSVAGINAL) (COMBO)   Final Result         1.  Possible arcuate uterine morphology and small free fluid collection in the pelvis, otherwise unremarkable transvaginal appearance of the pelvis.          ED COURSE  Vitals:    12/20/18 2146 12/20/18 2211 12/21/18 0131   BP: 119/76  116/56   Pulse: 88  75   Resp: 16  16   Temp: 36.4 °C (97.6 °F)     TempSrc: Temporal     SpO2: 99%  98%   Weight:  50.8 kg (111 lb 15.9 oz)    Height:  1.6 m (5' 3\")          Medications administered:  Medications   ketorolac (TORADOL) injection 15 mg (15 mg Intravenous Given 12/20/18 8517)     10:47 PM Patient seen and examined at bedside. The patient presents with abdominal pain. Ordered for US-Pelvic Transvaginal, Wet Prep, Chlamydia and GC by PCR, HCG Qual Serum, CBC, BMP, and UA to evaluate. Patient will be treated with Toradol 15 mg for her symptoms. I informed the patient that due to the consistent bleeding, I will need to do a pelvic exam. She understood and agreed.       MEDICAL DECISION MAKING  Patient 6 months postpartum who presents with left lower quadrant abdominal pain in the setting of current menstruation.  She is afebrile with normal vitals on arrival and well-appearing.  Abdominal exam is not concerning for bowel obstruction or perforation, appendicitis, diverticulitis.  Labs are reassuring without evidence of leukocytosis or electrolyte abnormality.  Patient is not pregnant.  Urinalysis is negative for infection.  Blood likely due to current menstruation, doubt nephrolithiasis.  " Pelvic exam is not concerning for PID.  Pelvic ultrasound is negative for ovarian torsion or cysts.  Wet prep returned back positive for bacterial vaginosis, gonorrhea and chlamydia testing is pending at this time.    11:04 PM Patient reevaluated at bedside. I performed a pelvic exam at this time. See above findings for details.     1:35 AM - Upon reassessment, patient is resting comfortably with normal vital signs.  No new complaints at this time.  She endorses improvement of her pain following Toradol.  Discussed results with patient and/or family as well as importance of primary care follow up.  She does not wish to be treated empirically for gonorrhea and chlamydia at this time.  Will discharge home on antibiotics for bacterial vaginosis.  Patient understands plan of care and strict return precautions for new or changing symptoms.     The patient will return for new or worsening symptoms and is stable at the time of discharge.    The patient is referred to a primary physician for blood pressure management, diabetic screening, and for all other preventative health concerns.    DISPOSITION:  Patient will be discharged home in stable condition.    FOLLOW UP:  Antonio Cline M.D.  [address] [place] [id number] [phone]    Schedule an appointment as soon as possible for a visit       [facility], Emergency Dept  [address] [place] [id number] [phone]    If symptoms worsen      OUTPATIENT MEDICATIONS:  New Prescriptions    METRONIDAZOLE (FLAGYL) 500 MG TAB    Take 1 Tab by mouth 2 Times a Day for 7 days.         IMPRESSION  (R10.32) LLQ abdominal pain  (N76.0,  B96.89) Bacterial vaginosis  (N93.8) Dysfunctional uterine bleeding    Results, diagnoses, and treatment options were discussed with the patient and/or family. Patient verbalized understanding of plan of care.       Carlos MOE (Scribe), am scribing for, and in the presence of, Venus Womack,  M.D..    Electronically signed by: Carlos Flores (Scribe), 12/20/2018    Venus MOE M.D. personally performed the services described in this documentation, as scribed by Carlos Flores in my presence, and it is both accurate and complete. C.     The note accurately reflects work and decisions made by me.  Venus Womack  12/21/2018  1:42 AM

## 2018-12-21 NOTE — DISCHARGE INSTRUCTIONS
You were seen in the Emergency Department for abdominal pain.    Labs, ultrasound, urine were completed without significant acute abnormalities.  Vaginal swabs showed bacterial vaginosis.  Gonorrhea and chlamydia tests are pending, we will call you if they are positive.    Please use 1,000mg of tylenol or 600mg of ibuprofen every 6 hours as needed for pain.  Take antibiotics as directed.    Please follow up with your primary care physician.    Return to the Emergency Department with fevers>100.4, worsening abdominal pain, persistent vomiting, or other concerns.

## 2018-12-21 NOTE — ED TRIAGE NOTES
"Chief Complaint   Patient presents with   • Abdominal Pain     Pt reports abdominal pain that started yesterday along with severe bloating.  Pain in central lower portion of abdomen and worsens when she needs to urinate.  Reports normal BM this AM.  She is postpartum x 6 months, and reposrt she started her period \"1 month go and I am still bleding with it.\".     Pt amb to triage with steady gait for above complaint.   Pt is alert and oriented, speaking in full sentences, follows commands and responds appropriately to questions. NAD. Resp are even and unlabored.  Pt placed in lobby. Pt educated on triage process. Pt encouraged to alert staff for any changes.    "

## 2019-07-15 ENCOUNTER — NON-PROVIDER VISIT (OUTPATIENT)
Dept: OCCUPATIONAL MEDICINE | Facility: CLINIC | Age: 24
End: 2019-07-15

## 2019-07-15 DIAGNOSIS — Z11.1 ENCOUNTER FOR PPD TEST: ICD-10-CM

## 2019-07-15 PROCEDURE — 86580 TB INTRADERMAL TEST: CPT | Performed by: PREVENTIVE MEDICINE

## 2019-07-17 ENCOUNTER — NON-PROVIDER VISIT (OUTPATIENT)
Dept: OCCUPATIONAL MEDICINE | Facility: CLINIC | Age: 24
End: 2019-07-17

## 2019-07-17 DIAGNOSIS — Z11.1 ENCOUNTER FOR PPD SKIN TEST READING: ICD-10-CM

## 2019-07-17 LAB — TB WHEAL 3D P 5 TU DIAM: NORMAL MM

## 2022-03-28 ENCOUNTER — OFFICE VISIT (OUTPATIENT)
Dept: MEDICAL GROUP | Facility: CLINIC | Age: 27
End: 2022-03-28
Payer: COMMERCIAL

## 2022-03-28 VITALS
SYSTOLIC BLOOD PRESSURE: 122 MMHG | OXYGEN SATURATION: 95 % | BODY MASS INDEX: 18.95 KG/M2 | HEIGHT: 64 IN | DIASTOLIC BLOOD PRESSURE: 77 MMHG | HEART RATE: 74 BPM | WEIGHT: 111 LBS | RESPIRATION RATE: 16 BRPM | TEMPERATURE: 98 F

## 2022-03-28 DIAGNOSIS — Z41.1 ENCOUNTER FOR BREAST AUGMENTATION: ICD-10-CM

## 2022-03-28 DIAGNOSIS — Z00.00 ENCOUNTER FOR WELL ADULT EXAM WITHOUT ABNORMAL FINDINGS: ICD-10-CM

## 2022-03-28 PROCEDURE — 99385 PREV VISIT NEW AGE 18-39: CPT | Mod: GE | Performed by: STUDENT IN AN ORGANIZED HEALTH CARE EDUCATION/TRAINING PROGRAM

## 2022-03-28 ASSESSMENT — PATIENT HEALTH QUESTIONNAIRE - PHQ9: CLINICAL INTERPRETATION OF PHQ2 SCORE: 0

## 2022-03-28 NOTE — PROGRESS NOTES
"Subjective:     CC: Surgical clearance and wellness visit    HPI:   Vika is a 26 y.o. female who presents today for the following problems:    Problem   Encounter for Breast Augmentation    -She is getting an elective breast augmentation performed here in the next couple months  -Needs clearance for surgery  -She is overall well and has no past medical history     Encounter for Well Adult Exam Without Abnormal Findings    -She is doing well overall  -She has no complaints today  -She does not take any medications  -She is being seen for surgical clearance today         No current [facility]-ordered outpatient medications on file.     No current [facility]-ordered facility-administered medications on file.     ROS:  See HPI    Objective:     Exam:  /77 (BP Location: Left arm, Patient Position: Sitting, BP Cuff Size: Adult)   Pulse 74   Temp 36.7 °C (98 °F) (Temporal)   Resp 16   Ht 1.626 m (5' 4\")   Wt 50.3 kg (111 lb)   SpO2 95%   BMI 19.05 kg/m²  Body mass index is 19.05 kg/m².    Gen:     Alert and oriented, No apparent distress.  HEENT:   NCAT, EOMI  Neck:     Neck is supple without lymphadenopathy.  Lungs:     Normal effort, CTA bilaterally, no wheezes, rhonchi, or rales  CV:     Regular rate and rhythm. No murmurs, rubs, or gallops.  Ext:     No clubbing, cyanosis, edema.      Assessment & Plan:     26 y.o. female with the following -     Problem List Items Addressed This Visit     Encounter for breast augmentation     -[employer] surgical risk calculator filled out  -She has a below average risk and all of the areas  -I believe she is a good surgical candidate and at low risk of complications         Encounter for well adult exam without abnormal findings     -We discussed many preventative measures today  -She was recently pregnant and was screened for HIV, gonorrhea, chlamydia, syphilis, hepatitis B, and hepatitis C.  All these were negative, and she has not had any high risk activity " since then so we will not perform these screenings again.  -Her last Pap smear was 1 year ago and was normal  -Her blood pressure is within normal limits at 122/77  -She has never smoked tobacco  -She has no family history of ovarian, uterine, cervical, or breast cancer.  We will not do BRCA testing at this time.  -PHQ-2 performed today with a score of 0.  -She denies any stressors at home and no partner violence

## 2022-03-28 NOTE — ASSESSMENT & PLAN NOTE
-We discussed many preventative measures today  -She was recently pregnant and was screened for HIV, gonorrhea, chlamydia, syphilis, hepatitis B, and hepatitis C.  All these were negative, and she has not had any high risk activity since then so we will not perform these screenings again.  -Her last Pap smear was 1 year ago and was normal  -Her blood pressure is within normal limits at 122/77  -She has never smoked tobacco  -She has no family history of ovarian, uterine, cervical, or breast cancer.  We will not do BRCA testing at this time.  -PHQ-2 performed today with a score of 0.  -She denies any stressors at home and no partner violence

## 2022-03-28 NOTE — ASSESSMENT & PLAN NOTE
-[employer] surgical risk calculator filled out  -She has a below average risk and all of the areas  -I believe she is a good surgical candidate and at low risk of complications

## 2022-04-25 ENCOUNTER — OFFICE VISIT (OUTPATIENT)
Dept: MEDICAL GROUP | Facility: CLINIC | Age: 27
End: 2022-04-25
Payer: COMMERCIAL

## 2022-04-25 VITALS
RESPIRATION RATE: 16 BRPM | SYSTOLIC BLOOD PRESSURE: 115 MMHG | OXYGEN SATURATION: 98 % | HEART RATE: 81 BPM | WEIGHT: 115.2 LBS | BODY MASS INDEX: 19.67 KG/M2 | HEIGHT: 64 IN | DIASTOLIC BLOOD PRESSURE: 79 MMHG

## 2022-04-25 DIAGNOSIS — Z98.82 STATUS POST BREAST AUGMENTATION: ICD-10-CM

## 2022-04-25 PROCEDURE — 99212 OFFICE O/P EST SF 10 MIN: CPT | Mod: GE | Performed by: FAMILY MEDICINE

## 2022-04-26 NOTE — ASSESSMENT & PLAN NOTE
-Sutures removed today in clinic without difficulty.  No bleeding or drainage observed after removal.  Patient tolerated it well.  Dr. Pang was available for the entirety of the procedure.  -She is going to follow-up virtually with the physician who performed a surgery.

## 2022-04-26 NOTE — PROGRESS NOTES
"Subjective:     CC: Stitch removal    HPI:   Vika is a 26 y.o. female who presents today for the following problems:    Problem   Status Post Breast Augmentation    -Patient had breast augmentation performed on April 9 in [place]  -She was told, since she lives far away, to just come to her doctor here in [place] for suture removal  -She reports that she felt something small kind of pop on her left side when she moved too quickly one day  -Otherwise, she has not noticed any swelling, drainage, or erythema, and her pain has been minimal         No current [facility]-ordered outpatient medications on file.     No current [facility]-ordered facility-administered medications on file.     ROS:  See HPI      Objective:     Exam:  /79 (BP Location: Right arm, Patient Position: Sitting, BP Cuff Size: Adult)   Pulse 81   Resp 16   Ht 1.626 m (5' 4\")   Wt 52.3 kg (115 lb 3.2 oz)   SpO2 98%   BMI 19.77 kg/m²  Body mass index is 19.77 kg/m².    Gen:     Alert and oriented, No apparent distress.  HEENT:   NCAT, EOMI  Neck:     Neck is supple without lymphadenopathy.  Chest:     One suture in place under each breast; minimal bruising present but no erythema.   Lungs:     Normal effort  CV:     Regular rate and rhythm. No murmurs, rubs, or gallops.  Ext:     No clubbing, cyanosis, edema.    A chaperone was offered to the patient during today's exam. Chaperone name: Adilene Harley was present.      Assessment & Plan:     26 y.o. female with the following -     Problem List Items Addressed This Visit     Status post breast augmentation     -Sutures removed today in clinic without difficulty.  No bleeding or drainage observed after removal.  Patient tolerated it well.  Dr. Elder was available for the entirety of the procedure.  -She is going to follow-up virtually with the physician who performed a surgery.                 No follow-ups on file.          "

## 2022-09-23 ENCOUNTER — OFFICE VISIT (OUTPATIENT)
Dept: INTERNAL MEDICINE | Facility: OTHER | Age: 27
End: 2022-09-23
Payer: COMMERCIAL

## 2022-09-23 VITALS
TEMPERATURE: 98.2 F | WEIGHT: 117.2 LBS | SYSTOLIC BLOOD PRESSURE: 112 MMHG | HEIGHT: 63 IN | OXYGEN SATURATION: 97 % | HEART RATE: 58 BPM | BODY MASS INDEX: 20.77 KG/M2 | DIASTOLIC BLOOD PRESSURE: 73 MMHG

## 2022-09-23 DIAGNOSIS — H69.92 DYSFUNCTION OF LEFT EUSTACHIAN TUBE: ICD-10-CM

## 2022-09-23 PROBLEM — N92.6 IRREGULAR PERIODS: Status: ACTIVE | Noted: 2020-06-04

## 2022-09-23 PROBLEM — M99.04 SEGMENTAL AND SOMATIC DYSFUNCTION OF SACRAL REGION: Status: ACTIVE | Noted: 2020-02-11

## 2022-09-23 PROCEDURE — 99213 OFFICE O/P EST LOW 20 MIN: CPT | Mod: GC | Performed by: STUDENT IN AN ORGANIZED HEALTH CARE EDUCATION/TRAINING PROGRAM

## 2022-09-23 RX ORDER — MEDROXYPROGESTERONE ACETATE 150 MG/ML
INJECTION, SUSPENSION INTRAMUSCULAR
COMMUNITY
Start: 2019-03-25 | End: 2022-09-23

## 2022-09-23 RX ORDER — FLUTICASONE PROPIONATE 50 MCG
1 SPRAY, SUSPENSION (ML) NASAL DAILY
Qty: 16 G | Refills: 0 | Status: SHIPPED | OUTPATIENT
Start: 2022-09-23 | End: 2022-10-07

## 2022-09-23 RX ORDER — NITROFURANTOIN 25; 75 MG/1; MG/1
100 CAPSULE ORAL 2 TIMES DAILY
COMMUNITY
Start: 2022-07-28 | End: 2022-09-23

## 2022-09-23 NOTE — PROGRESS NOTES
"Maribel Mckeon is a 26 y.o. female who presents with Ear Fullness (Left ear feels like it is clogged, been having this issue for 3 weeks, thinks has ear infection, no pain) and Follow-Up    Acute visit for left ear fullness .   Patient reports left ear fullness for 3 weeks. No upper respiratory infection /congestion. No ear pain or discharge. No change in hearing, no tinnitus/dizziness or vertigo.No recent travel to high altitudes. No history of recurrent ear infections/trauma. Denies any other acute concerns at present. No history of any chronic medical problems. Follows up at Family medicine clinic.     ROS         General: No fevers, chills, night sweats, weight loss or gain  HEENT: No hearing changes, vision changes, eye pain, ear pain, nasal discharge, sore throat  Neck: No swelling in neck  Pulmonary: No shortness of breath, cough, sputum, or hemoptysis  Cardiovascular: No chest pain, palpitations, or LE swelling  GI: No nausea, vomiting, diarrhea, constipation, abdominal pain, hematochezia or melena  : No dysuria or frequency  Neuro: No focal weakness, no general weakness, no headaches, no lightheadedness, no dizziness  Psych: No anxiety or depression      Objective     /73 (BP Location: Left arm, Patient Position: Sitting, BP Cuff Size: Adult)   Pulse (!) 58   Temp 36.8 °C (98.2 °F) (Temporal)   Ht 1.6 m (5' 3\")   Wt 53.2 kg (117 lb 3.2 oz)   SpO2 97%   BMI 20.76 kg/m²      Physical Exam          General: Well developed, well nourished female, in no distress.  HEENT: NC/AT, PERRL, EOMI, no scleral icterus or conjunctival pallor, fair dentition/denture in, no nasal discharge or oral erythema or exudates.   EAR exam: no cerumen impaction. Bilateral ear TM mildly retracted. No fluid pocket/signs of infection  Neck: Supple, No cervical or supraclavicular LAD  CV:RRR, no murmurs gallops or rubs  Pulm: LCAB, no crackles, rales, rhonchi, or wheezing  GI: Normal bowel sounds, abdomen " soft, nontender, nondistended to deep or light palpation in all 4 quadrants, no HSM.  MSK: normal ROM.Radial and dorsalis pedis pulses 2+ and equal bilaterally, respectively.No lower extremity edema  Neuro: Patient is alert and oriented x3, no focal deficits,Strength 5 out of 5 in upper and lower extremities  Psych: Appropriate mood and affect         Assessment & Plan        1. Dysfunction of left eustachian tube    Most likely eustachian tube dysfunction. No concerns for ear infection  Education provided .Discussed with patient to have good hydration.  Will try Flonase nasal spray to help if any internal inflammation/discharge blocking the ET tube.   plan  - fluticasone (FLONASE) 50 MCG/ACT nasal spray; Administer 1 Spray into affected nostril(S) every day for 14 days.  Dispense: 16 g; Refill: 0        Discussed getting vaccinations including Influenza. Patient states she will get it at her primary care office.

## 2022-09-23 NOTE — PATIENT INSTRUCTIONS
Flonase nasal spray   Keep hydrated   If no improvement in symptoms in 2 weeks , let us know.  Consider vaccination next visit

## 2023-07-13 ENCOUNTER — OFFICE VISIT (OUTPATIENT)
Dept: INTERNAL MEDICINE | Facility: OTHER | Age: 28
End: 2023-07-13
Payer: COMMERCIAL

## 2023-07-13 VITALS
HEART RATE: 75 BPM | HEIGHT: 63 IN | DIASTOLIC BLOOD PRESSURE: 84 MMHG | BODY MASS INDEX: 21.97 KG/M2 | SYSTOLIC BLOOD PRESSURE: 121 MMHG | OXYGEN SATURATION: 96 % | WEIGHT: 124 LBS | TEMPERATURE: 98.2 F

## 2023-07-13 DIAGNOSIS — L73.9 FOLLICULITIS: ICD-10-CM

## 2023-07-13 DIAGNOSIS — M76.51 PATELLAR TENDINITIS OF RIGHT KNEE: ICD-10-CM

## 2023-07-13 PROCEDURE — 3079F DIAST BP 80-89 MM HG: CPT | Mod: GC

## 2023-07-13 PROCEDURE — 99214 OFFICE O/P EST MOD 30 MIN: CPT | Mod: GC

## 2023-07-13 PROCEDURE — 3074F SYST BP LT 130 MM HG: CPT | Mod: GC

## 2023-07-13 ASSESSMENT — ENCOUNTER SYMPTOMS
PSYCHIATRIC NEGATIVE: 1
WEIGHT LOSS: 0
MUSCULOSKELETAL NEGATIVE: 1
GASTROINTESTINAL NEGATIVE: 1
CHILLS: 0
NEUROLOGICAL NEGATIVE: 1
RESPIRATORY NEGATIVE: 1
CARDIOVASCULAR NEGATIVE: 1
FEVER: 0
EYES NEGATIVE: 1
CONSTITUTIONAL NEGATIVE: 1

## 2023-07-13 ASSESSMENT — PATIENT HEALTH QUESTIONNAIRE - PHQ9: CLINICAL INTERPRETATION OF PHQ2 SCORE: 0

## 2023-07-14 NOTE — PROGRESS NOTES
Established Patient    Patient Care Team:  Antonio Cline M.D. as PCP - General (Family Medicine)    CC:   Chief Complaint   Patient presents with    Knee Pain     Right knee px for 3 weeks. Stops then comes back. Feels like there is a lot of weight on her knee. Pt reports she has used a knee brace for support but this has not helped     Vaginal Pain     Pt reports vaginal discomfort that started a week ago. Wants new referral to OB as well       HPI:  Vika Mckeon is a 27 y.o. female who presents today with the following Chief Complaint(s): Knee pain and for mass near the genital area.    Patient reports that she is frequently bearing weight on her knees for her line of work.  She states that she unintentionally banged her right knee something approximately 3 weeks ago and has had soreness since then.  Has been able to bear full weight on the right leg.  Patient has attempted to use a brace she had purchased over-the-counter with little improvement.  Patient has not taken over-the-counter analgesia.    Patient also reported concern for bump she noticed.  Unclear on duration or progression of this bump, she has noticed that it has been uncomfortable in the past week due to friction caused by it rubbing against her body during ambulation.  Patient reports monogamous relationship the past 10 years with no new sexual partners in that time.  Currently not using protection.  No noted vaginal discharge or itchiness.  Patient does report frequent waxing and shaving of area in question.      ROS:       Review of Systems   Constitutional: Negative.  Negative for chills, fever, malaise/fatigue and weight loss.   HENT: Negative.     Eyes: Negative.    Respiratory: Negative.     Cardiovascular: Negative.    Gastrointestinal: Negative.    Genitourinary: Negative.  Negative for dysuria, frequency and hematuria.   Musculoskeletal: Negative.    Skin:  Positive for itching and rash.   Neurological: Negative.   "  Endo/Heme/Allergies: Negative.    Psychiatric/Behavioral: Negative.           No past medical history on file.  Patient Active Problem List    Diagnosis Date Noted    Dysfunction of left eustachian tube 09/23/2022    Status post breast augmentation 04/25/2022    Encounter for breast augmentation 03/28/2022    Encounter for well adult exam without abnormal findings 03/28/2022    Irregular periods 06/04/2020    Segmental and somatic dysfunction of sacral region 02/11/2020    Encounter for postpartum care of lactating mother 07/17/2018    Rubella equivocal 12/12/2014     Social History     Tobacco Use    Smoking status: Never    Smokeless tobacco: Never   Vaping Use    Vaping Use: Never used   Substance Use Topics    Alcohol use: No    Drug use: No     Current Outpatient Medications   Medication Sig Dispense Refill    mupirocin (BACTROBAN) 2 % Ointment Apply 1 Application topically 2 times a day. 22 g 0    hydrocortisone 2.5 % Cream topical cream Apply 1 Application topically 2 times a day. 3.5 g 0     No current facility-administered medications for this visit.     No past surgical history on file.  Family History   Problem Relation Age of Onset    Diabetes Maternal Grandmother      Social History     Tobacco Use    Smoking status: Never    Smokeless tobacco: Never   Vaping Use    Vaping Use: Never used   Substance Use Topics    Alcohol use: No    Drug use: No       Allergies:  Nkda [no known drug allergy]    Physical Exam:  /84 (BP Location: Left arm, Patient Position: Sitting, BP Cuff Size: Adult)   Pulse 75   Temp 36.8 °C (98.2 °F) (Temporal)   Ht 1.6 m (5' 3\")   Wt 56.2 kg (124 lb)   SpO2 96%   BMI 21.97 kg/m²     Pelvic exam chaperoned by Gregoria Rocha and Dr. Chuy Freitas    General: Well-developed, well-nourished female in no distress  HEENT: NC/AT  Eyes: Conjuntiva without any obvious injection or erythema.   Mouth: mucous membranes moist, no erythema  Neck: Supple, moving " spontaneously  Extremites: No cyanosis/clubbing/edema. No obvious deformities. Palpable tenderness over distal portion of patellar tendon. Anterior/posterior drawer signs negative.  Skin: Warm and dry.  Visible singular fleshy, fluctuant mass approximately 1cm noted on anterior/inferior aspect of right gluteus, inferior to right labia majora.  Neuro: Alert & oriented, grossly non-focal  Psych: Patient is awake, alert and oriented with recent and remote memory intact. Affect normal, mood normal, judgment normal.    Assessment and Plan:   No problem-specific Assessment & Plan notes found for this encounter.       1. Patellar tendinitis of right knee  Patient counseled on RICE.  Patient interested in attempting different knee braces the 1 she has been using was not useful for limiting pain.  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  - Knee Brace sent to pharmacy  - patient counseled to utilize tylenol or ibuprofen as needed for pain    2. Folliculitis  Fluctuance and discomfort of bump seen in perineal area likely most consistent with folliculitis.   -Hydrocortisone cream to rapidly reduce inflammation to avoid discomfort during walking.  - Mupirocin cream to treat associated folliculitis and prevent further folliculitis associated with steroid application    All imaging results and lab results and consult notes are reviewed at this visit.      Signed by: Jesus Garcia M.D.  PGY II

## 2023-07-31 ENCOUNTER — OFFICE VISIT (OUTPATIENT)
Dept: INTERNAL MEDICINE | Facility: OTHER | Age: 28
End: 2023-07-31
Payer: COMMERCIAL

## 2023-07-31 VITALS
BODY MASS INDEX: 22.54 KG/M2 | HEART RATE: 60 BPM | SYSTOLIC BLOOD PRESSURE: 108 MMHG | HEIGHT: 63 IN | WEIGHT: 127.2 LBS | TEMPERATURE: 98.1 F | OXYGEN SATURATION: 99 % | DIASTOLIC BLOOD PRESSURE: 60 MMHG

## 2023-07-31 DIAGNOSIS — Z11.3 SCREENING FOR STD (SEXUALLY TRANSMITTED DISEASE): ICD-10-CM

## 2023-07-31 DIAGNOSIS — H10.9 CONJUNCTIVITIS OF RIGHT EYE, UNSPECIFIED CONJUNCTIVITIS TYPE: ICD-10-CM

## 2023-07-31 DIAGNOSIS — Z97.3 WEARS CONTACT LENSES: ICD-10-CM

## 2023-07-31 PROBLEM — Z98.82 STATUS POST BREAST AUGMENTATION: Status: RESOLVED | Noted: 2022-04-25 | Resolved: 2023-07-31

## 2023-07-31 PROBLEM — Z00.00 ENCOUNTER FOR WELL ADULT EXAM WITHOUT ABNORMAL FINDINGS: Status: RESOLVED | Noted: 2022-03-28 | Resolved: 2023-07-31

## 2023-07-31 PROCEDURE — 3078F DIAST BP <80 MM HG: CPT | Mod: GC | Performed by: STUDENT IN AN ORGANIZED HEALTH CARE EDUCATION/TRAINING PROGRAM

## 2023-07-31 PROCEDURE — 3074F SYST BP LT 130 MM HG: CPT | Mod: GC | Performed by: STUDENT IN AN ORGANIZED HEALTH CARE EDUCATION/TRAINING PROGRAM

## 2023-07-31 PROCEDURE — 99213 OFFICE O/P EST LOW 20 MIN: CPT | Mod: GC | Performed by: STUDENT IN AN ORGANIZED HEALTH CARE EDUCATION/TRAINING PROGRAM

## 2023-07-31 RX ORDER — ERYTHROMYCIN 5 MG/G
1 OINTMENT OPHTHALMIC 2 TIMES DAILY
Qty: 3.5 G | Refills: 0 | Status: SHIPPED | OUTPATIENT
Start: 2023-07-31 | End: 2023-08-07

## 2023-07-31 NOTE — LETTER
July 31, 2023    To Whom It May Concern:         This is confirmation that Vika Mckeon attended her scheduled appointment with Wili Sears M.D. on 7/31/23. She is  excused from work until and including 8/3/23.         If you have any questions please do not hesitate to call me at the phone number listed below.    Sincerely,          Wili Sears M.D.  [phone]

## 2023-08-01 ENCOUNTER — TELEPHONE (OUTPATIENT)
Dept: INTERNAL MEDICINE | Facility: OTHER | Age: 28
End: 2023-08-01
Payer: COMMERCIAL

## 2023-08-01 PROBLEM — H10.9 CONJUNCTIVITIS: Status: ACTIVE | Noted: 2023-08-01

## 2023-08-01 NOTE — PROGRESS NOTES
"      Office Visit Follow up    Chief Complaint   Patient presents with    Eye Problem     Patient here for possible pink eye times one week       Subjective   Pt presents with R eye \"pressure\" like pain x1 week which comes and goes. Of note, pt does frequently look through a microscope at work and uses contact lenses, but there was no inciting trauma. The pain is worse with blinking. The pain was not improved with OTC eye drops for dry eyes. Pt does report associated light sensitivity as well as associated pus. She denies any vision changes, difficulty with extraocular eye movements, or F/C/S. T      /60 (BP Location: Left arm, Patient Position: Sitting, BP Cuff Size: Adult)   Pulse 60   Temp 36.7 °C (98.1 °F) (Temporal)   Ht 1.6 m (5' 3\")   Wt 57.7 kg (127 lb 3.2 oz)   SpO2 99%   BMI 22.53 kg/m²     Physical Exam   -General: NAD, converses well  -Cardio: no murmurs or gallops  -Resp: lungs CTAB, symmetric expansion  -Abd: soft and nontender, no guarding or rebound  -Eyes: R eye with erythema. EOMI. PERRL.    Data   -Hgb = 14.8  -Cr = 0.76       Note: I have reviewed all pertinent labs and diagnostic tests associated with this visit with specific comments listed under the assessment and plan below    Assessment and Plan  Vika Mckeon is a 27 year old  Female with a h/o Breast Augmentation ().    She presented with R eye conjunctivitis. Hepatitis panel/RPR/GCCT/HIV = pending.    -----------------------------------------------------------------------------------------------------------------------------------------------------------------------------------------------------------  #R Eye Conjunctivitis  (Dx on physical exam. Given that it is unilateral and has associated purulence, there is concern for bacterial conjunctivitis. EOMI and PERRL)  -Erythromycin eye drops (started 23) x7 days  -advised pt not to wear contact lenses    #Sexual Activity  (Pt has 1 partner and uses " protection)  -STD screening pending as above\        #Preventative Health Care  -COVID vaccine = declined  -next TD booster = due ~2028  -pt is ??? not a vegetarian  -HPV Vaccine = due NOW  -next PAP = due ~2025  -HIV x1 = pending  -Hep C x1 = pending      Code Status = unknown    Followup: 5 weeks      Wili Sears, PGY3  [facility]    Pt has been seen and discussed with the Attending Physician.

## 2023-08-01 NOTE — TELEPHONE ENCOUNTER
Called patient to follow up on symptoms.   Continues to deny FB sensation and vision changes.   States that after 2 doses of erythromycin- she feels much better. Redness, discharge and even photosensitivity is better. She is also able to keep her eyes open now compared to yesterday  When asked if she still is able to see white spot - patient answered yes.   I have placed urgent ophthalmology referral anyway to follow up on that.   However low concern for infectious keratitis at this time given improvement   Patient also instructed to try to send picture of white spot on my chart.   Also offered patient to come in for fluorescin dye exam if not continuing to get better.

## 2023-09-12 ENCOUNTER — OFFICE VISIT (OUTPATIENT)
Dept: INTERNAL MEDICINE | Facility: OTHER | Age: 28
End: 2023-09-12
Payer: COMMERCIAL

## 2023-09-12 VITALS
DIASTOLIC BLOOD PRESSURE: 74 MMHG | BODY MASS INDEX: 22.08 KG/M2 | WEIGHT: 124.6 LBS | HEART RATE: 75 BPM | TEMPERATURE: 97.6 F | SYSTOLIC BLOOD PRESSURE: 118 MMHG | HEIGHT: 63 IN | OXYGEN SATURATION: 95 %

## 2023-09-12 DIAGNOSIS — R05.1 ACUTE COUGH: ICD-10-CM

## 2023-09-12 PROCEDURE — 3074F SYST BP LT 130 MM HG: CPT

## 2023-09-12 PROCEDURE — 99213 OFFICE O/P EST LOW 20 MIN: CPT | Mod: GC

## 2023-09-12 PROCEDURE — 3078F DIAST BP <80 MM HG: CPT

## 2023-09-12 RX ORDER — LORATADINE 10 MG/1
10 TABLET ORAL DAILY
Qty: 7 TABLET | Refills: 0 | Status: SHIPPED | OUTPATIENT
Start: 2023-09-12

## 2023-09-12 ASSESSMENT — ENCOUNTER SYMPTOMS
EYE DISCHARGE: 0
CHILLS: 0
SORE THROAT: 0
EYE PAIN: 0
HEMOPTYSIS: 0
DIAPHORESIS: 0
WHEEZING: 0
FEVER: 0
SHORTNESS OF BREATH: 0
SINUS PAIN: 0
COUGH: 1
SPUTUM PRODUCTION: 0
EYE REDNESS: 0

## 2023-09-12 NOTE — PROGRESS NOTES
"Teaching Physician Attestation      Level of Participation    I have personally interviewed and examined the patient.  In addition, I discussed with the resident physician the patient's history, exam, assessment and plan in detail.  Topics listed in my addendum were the focus of the visit.  Healthcare maintenance was not addressed this visit unless listed as a topic in my addendum.  I agree with the plan as written along with the following additions/modifications:    Acute bronchitis, possibly secondary to COVID-19 infection, resolving  -Over 2 weeks out, dry cough began after being out with friends, others at work with \"bronchitis\" also.  Dry cough with mild intermittent shortness of breath although both significantly improving.  Today no distress, saturating normally, lungs clear to auscultation bilaterally.  No allergic symptoms but did recently moved to new home.  Supportive care, trial antihistamine to see if component of new irritant/mild allergen in new home may also be contributing for 1 week.  If continues significantly beyond the 3-week jose or new symptoms will consider work-up further.      "

## 2023-09-13 NOTE — PROGRESS NOTES
"OFFICE VISIT    Vika Mckeon is a 27 y.o. female who presents today with the following:    Reason for visit:  Cough for past 2 weeks.     HPI:  26 yo female without chronic PMH. Presents for acute visit d/t the following:     Acute cough. Ongoing for past 14-17 days. Started after night out at bar. Initially just sporadic dry cough that progressively became more frequent and by week 2 occasionally had multiple episodes of associated dyspnea d/t cough and/or exertion. Recently at new apartment in past month; possibly added culprit. Indicated Loratadine daily for 1 week. Avoid external allergens when possible. Counseled on worsening symptoms requiring medical attention.     Review of Systems   Constitutional:  Negative for chills, diaphoresis, fever and malaise/fatigue.   HENT:  Negative for congestion, ear discharge, ear pain, sinus pain and sore throat.    Eyes:  Negative for pain, discharge and redness.   Respiratory:  Positive for cough. Negative for hemoptysis, sputum production, shortness of breath and wheezing.    Cardiovascular:  Negative for chest pain.       /74 (BP Location: Left arm, Patient Position: Sitting, BP Cuff Size: Adult)   Pulse 75   Temp 36.4 °C (97.6 °F) (Temporal)   Ht 1.6 m (5' 3\")   Wt 56.5 kg (124 lb 9.6 oz)   SpO2 95%   BMI 22.07 kg/m²     Physical Exam  Constitutional:       General: She is not in acute distress.     Appearance: Normal appearance. She is not ill-appearing.   HENT:      Right Ear: Tympanic membrane, ear canal and external ear normal. There is no impacted cerumen.      Left Ear: Tympanic membrane, ear canal and external ear normal. There is no impacted cerumen.      Nose: Nose normal. No congestion.      Mouth/Throat:      Mouth: Mucous membranes are moist.      Pharynx: Oropharynx is clear.   Eyes:      General:         Right eye: No discharge.         Left eye: No discharge.   Pulmonary:      Effort: Pulmonary effort is normal. No respiratory distress. "      Breath sounds: Normal breath sounds.      Comments: SpO2 > 96% on room air.   Chest:      Chest wall: No tenderness.   Lymphadenopathy:      Cervical: No cervical adenopathy.   Neurological:      Mental Status: She is alert.         Assessment and Plan:   28 yo female without chronic PMH. Acute visit d/t:    Acute cough  Ongoing for past 14-17 days.   Started as a dry cough initially, second week had episodes of increased frequency and associated shortness of breath. Currently refers shortness of breath has resolved and symptoms are progressively improving.   Has recently moved to a new apartment in past month. Possibly added culprit.   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   - Loratadine 10 mg daily, 7 days.   - Fluid intake: 2-2.5 liters/day.   - Avoidance of external allergens; wear mask at work.   - If worsening cough, new onset fever, recurrence/worsening shortness     of breath; seek medical attention.     Orders Placed This Encounter    loratadine (CLARITIN) 10 MG Tab       No follow-ups on file.      Patient case was seen/ assessed/ discussed with Dr. Larkin.    Signed by:    Sudeep Pierre, PGY-2, Internal Medicine  [facility]

## 2023-09-13 NOTE — PATIENT INSTRUCTIONS
Take Loratadine 10 mg daily at bedtime.   Drink 2-2.5 liters of fluids per day.   This can last up to 1 month since initiation; if lasting more or worsening please seek medical attention.   If new onset/persistent fever, worsening shortness of breath, pain with deep breathing, seek medical attention.   Follow-up as needed.

## 2023-09-13 NOTE — ASSESSMENT & PLAN NOTE
Ongoing for past 14-17 days.   Started as a dry cough initially, second week had episodes of increased frequency and associated shortness of breath. Currently refers shortness of breath has resolved and symptoms are progressively improving.   Has recently moved to a new apartment in past month. Possibly added culprit.   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   - Loratadine 10 mg daily, 7 days.   - Fluid intake: 2-2.5 liters/day.   - Avoidance of external allergens; wear mask at work.   - If worsening cough, new onset fever, recurrence/worsening shortness     of breath; seek medical attention.

## 2023-10-10 ENCOUNTER — OFFICE VISIT (OUTPATIENT)
Dept: INTERNAL MEDICINE | Facility: OTHER | Age: 28
End: 2023-10-10
Payer: COMMERCIAL

## 2023-10-10 VITALS
TEMPERATURE: 97.4 F | WEIGHT: 122.8 LBS | SYSTOLIC BLOOD PRESSURE: 104 MMHG | HEART RATE: 62 BPM | DIASTOLIC BLOOD PRESSURE: 68 MMHG | HEIGHT: 63 IN | BODY MASS INDEX: 21.76 KG/M2 | OXYGEN SATURATION: 98 %

## 2023-10-10 DIAGNOSIS — Z30.016 ENCOUNTER FOR INITIAL PRESCRIPTION OF TRANSDERMAL PATCH HORMONAL CONTRACEPTIVE DEVICE: ICD-10-CM

## 2023-10-10 DIAGNOSIS — Z30.09 FAMILY PLANNING: ICD-10-CM

## 2023-10-10 PROCEDURE — 99213 OFFICE O/P EST LOW 20 MIN: CPT | Mod: GC | Performed by: HOSPITALIST

## 2023-10-10 PROCEDURE — 3078F DIAST BP <80 MM HG: CPT | Mod: GC | Performed by: HOSPITALIST

## 2023-10-10 PROCEDURE — 3074F SYST BP LT 130 MM HG: CPT | Mod: GC | Performed by: HOSPITALIST

## 2023-10-10 RX ORDER — NORELGESTROMIN AND ETHINYL ESTRADIOL 35; 150 UG/MG; UG/MG
1 PATCH TRANSDERMAL
Qty: 4 PATCH | Refills: 1 | Status: SHIPPED | OUTPATIENT
Start: 2023-10-10

## 2023-10-10 NOTE — PATIENT INSTRUCTIONS
Thank you for allowing us to participate in your care today. Please follow up in 12 weeks or earlier as needed. A referral was placed on your behalf to gynecology. The patch can be placed on your butt, arm, or back. Remember to change it every 7 days. Please use barrier contraception for the first 2 weeks on the patch.     https://www.[facility].org/learn/birth-control/birth-control-patch#:~:text=Here%20are%20some,your%20patch%20area.    You wear the Xulane or Twirla patch on your belly, butt, or back. You can also wear the Xulane patch on the outer part of your upper arm. The hormones in the patch go into your body through your skin.

## 2023-10-10 NOTE — LETTER
[facility]  Wili Sears M.D.  [address] [facility] [place] [address]  Fax: [phone]   Authorization for Release/Disclosure of   Protected Health Information   Name: VIKA MILLER : 1995 SSN: [id number]   Address: [facility] [address]  [place] [address] Phone:    [phone] (home)    I authorize the entity listed below to release/disclose the PHI below to:   [facility]/Wili Sears M.D. and Jo Cain M.D.   Provider or Entity Name:     Address   City, State, Zip   Phone:      Fax:     Reason for request: continuity of care   Information to be released:    [  ] LAST COLONOSCOPY,  including any PATH REPORT and follow-up  [  ] LAST FIT/COLOGUARD RESULT [  ] LAST DEXA  [  ] LAST MAMMOGRAM  [  ] LAST PAP  [  ] LAST LABS [  ] RETINA EXAM REPORT  [  ] IMMUNIZATION RECORDS  [  ] Release all info      [  ] Check here and initial the line next to each item to release ALL health information INCLUDING  _____ Care and treatment for drug and / or alcohol abuse  _____ HIV testing, infection status, or AIDS  _____ Genetic Testing    DATES OF SERVICE OR TIME PERIOD TO BE DISCLOSED: _____________  I understand and acknowledge that:  * This Authorization may be revoked at any time by you in writing, except if your health information has already been used or disclosed.  * Your health information that will be used or disclosed as a result of you signing this authorization could be re-disclosed by the recipient. If this occurs, your re-disclosed health information may no longer be protected by State or Federal laws.  * You may refuse to sign this Authorization. Your refusal will not affect your ability to obtain treatment.  * This Authorization becomes effective upon signing and will  on (date) __________.      If no date is indicated, this Authorization will  one (1) year from the signature date.    Name: Vika Miller  Signature: Date:   10/10/2023     PLEASE FAX REQUESTED  RECORDS BACK TO: [phone]

## 2023-10-10 NOTE — LETTER
[facility]  Wili Sears M.D.  [address] [facility] [place] [address]  Fax: [phone]   Authorization for Release/Disclosure of   Protected Health Information   Name: VIKA MILLER : 1995 SSN: [id number]   Address: [facility] [address]  [place] [address] Phone:    [phone] (home)    I authorize the entity listed below to release/disclose the PHI below to:   [facility]/Wili Sears M.D. and Jo Cain M.D.   Provider or Entity Name:     Address   City, State, Zip   Phone:      Fax:     Reason for request: continuity of care   Information to be released:    [  ] LAST COLONOSCOPY,  including any PATH REPORT and follow-up  [  ] LAST FIT/COLOGUARD RESULT [  ] LAST DEXA  [  ] LAST MAMMOGRAM  [  ] LAST PAP  [  ] LAST LABS [  ] RETINA EXAM REPORT  [  ] IMMUNIZATION RECORDS  [  ] Release all info      [  ] Check here and initial the line next to each item to release ALL health information INCLUDING  _____ Care and treatment for drug and / or alcohol abuse  _____ HIV testing, infection status, or AIDS  _____ Genetic Testing    DATES OF SERVICE OR TIME PERIOD TO BE DISCLOSED: _____________  I understand and acknowledge that:  * This Authorization may be revoked at any time by you in writing, except if your health information has already been used or disclosed.  * Your health information that will be used or disclosed as a result of you signing this authorization could be re-disclosed by the recipient. If this occurs, your re-disclosed health information may no longer be protected by State or Federal laws.  * You may refuse to sign this Authorization. Your refusal will not affect your ability to obtain treatment.  * This Authorization becomes effective upon signing and will  on (date) __________.      If no date is indicated, this Authorization will  one (1) year from the signature date.    Name: Vika Miller  Signature: Date:   10/10/2023     PLEASE FAX REQUESTED  RECORDS BACK TO: [phone]

## 2023-10-11 NOTE — PROGRESS NOTES
"Maribel Mckeon is a 28 y.o. female who presents with Contraception (Patient here for birth control)            HPI  Would like birth control  Was last taken 1.5-2 years ago  Was on nexplanon, and would like to return to nexplanon  Last papsmear was in June, results normal  Had STI testing in June and the results were   Is sexually active, 1 partner  Not concerned about HIV or other STI's  Currently on menstrual cycle, and not pregnant    ROS  Review of systems as described in HPI.           Objective     /68 (BP Location: Right arm, Patient Position: Sitting, BP Cuff Size: Adult)   Pulse 62   Temp 36.3 °C (97.4 °F) (Temporal)   Ht 1.6 m (5' 3\")   Wt 55.7 kg (122 lb 12.8 oz)   SpO2 98%   BMI 21.75 kg/m²      Physical Exam  Constitutional:       General: She is not in acute distress.     Appearance: She is not ill-appearing or toxic-appearing.   HENT:      Nose: No rhinorrhea.   Eyes:      General: No scleral icterus.        Right eye: No discharge.         Left eye: No discharge.      Extraocular Movements: Extraocular movements intact.   Cardiovascular:      Rate and Rhythm: Normal rate.   Pulmonary:      Effort: Pulmonary effort is normal.   Musculoskeletal:         General: Normal range of motion.      Cervical back: Normal range of motion. No rigidity.   Skin:     Coloration: Skin is not jaundiced or pale.   Neurological:      Gait: Gait normal.   Psychiatric:         Mood and Affect: Mood normal.             Assessment & Plan        1. Family planning  A referral was placed on your behalf to gynecology.   - Referral to Gynecology  - norelgestromin-ethinyl estradiol (ORTHO EVRA) 150-35 MCG/24HR patch; Place 1 Patch on the skin every 7 days.  Dispense: 4 Patch; Refill: 1    2. Encounter for initial prescription of transdermal patch hormonal contraceptive device  The patch can be placed on your butt, arm, or back. Remember to change it every 7 days. Please use barrier contraception " for the first 2 weeks on the patch.   - norelgestromin-ethinyl estradiol (ORTHO EVRA) 150-35 MCG/24HR patch; Place 1 Patch on the skin every 7 days.  Dispense: 4 Patch; Refill: 1    Follow up: Thank you for allowing us to participate in your care today. Please follow up in 12 weeks or earlier as needed. A referral was placed on your behalf to gynecology. The patch can be placed on your butt, arm, or back. Remember to change it every 7 days. Please use barrier contraception for the first 2 weeks on the patch.       You wear the Xulane or Twirla patch on your belly, butt, or back. You can also wear the Xulane patch on the outer part of your upper arm. The hormones in the patch go into your body through your skin.    Jo Cain MD MPH  PGY-3  [facility]     Please note that this dictation was created using voice recognition software. I have made every reasonable attempt to correct obvious errors, but I expect that there are errors of grammar and possibly content that I did not discover before finalizing the note.

## 2024-01-19 ENCOUNTER — APPOINTMENT (OUTPATIENT)
Dept: INTERNAL MEDICINE | Facility: OTHER | Age: 29
End: 2024-01-19
Payer: COMMERCIAL

## 2024-04-05 NOTE — TELEPHONE ENCOUNTER
Positive for BV, needs treatment if symptomatic.    I called pt to notify of +BV, denies Vag discharge, foul odor or any other complications, states currently not having any symptoms will notify provider next appt if some thing change.   
no

## 2024-12-26 ENCOUNTER — OFFICE VISIT (OUTPATIENT)
Dept: INTERNAL MEDICINE | Facility: OTHER | Age: 29
End: 2024-12-26
Payer: COMMERCIAL

## 2024-12-26 VITALS
HEIGHT: 64 IN | DIASTOLIC BLOOD PRESSURE: 81 MMHG | TEMPERATURE: 98.3 F | SYSTOLIC BLOOD PRESSURE: 116 MMHG | OXYGEN SATURATION: 96 % | BODY MASS INDEX: 21.95 KG/M2 | WEIGHT: 128.6 LBS | HEART RATE: 70 BPM

## 2024-12-26 DIAGNOSIS — A74.9 CHLAMYDIA: ICD-10-CM

## 2024-12-26 DIAGNOSIS — Z11.3 SCREENING FOR STD (SEXUALLY TRANSMITTED DISEASE): ICD-10-CM

## 2024-12-26 DIAGNOSIS — Z30.09 BIRTH CONTROL COUNSELING: ICD-10-CM

## 2024-12-26 PROCEDURE — 3079F DIAST BP 80-89 MM HG: CPT | Mod: GC

## 2024-12-26 PROCEDURE — 99213 OFFICE O/P EST LOW 20 MIN: CPT | Mod: GE

## 2024-12-26 PROCEDURE — 3074F SYST BP LT 130 MM HG: CPT | Mod: GC

## 2024-12-26 ASSESSMENT — PATIENT HEALTH QUESTIONNAIRE - PHQ9: CLINICAL INTERPRETATION OF PHQ2 SCORE: 0

## 2024-12-26 NOTE — PROGRESS NOTES
Office Visit Initial Encounter    Chief Complaint   Patient presents with    Follow-Up    Sexually Transmitted Diseases     Full panel testing     Contraception     Requesting birth control   Pt not good with pill, good experience with nexplanon        HPI   Ms. Mckeon is a 29-year-old female without medical history, who presents for reestablishment of care and with concerns of possible STD.  States that she has had 2 heterosexual partners this year, last intercourse concerning for condom break.  States no unusual vaginal discharge, vaginal bleeding, vaginal pain, pelvic pain, dysuria, some minor discomfort in the vaginal vault without any other symptoms as above.  She has history of chlamydia in 2018, otherwise no STD history. She is  A1 , with non complicated pregnancies. Last pap smear thinks was last year (at another facility, no records).  She is also interested in a more definitive birth control, specifically on Nexplanon, she has experience with IUDs and multiple hormonal forms which she has issues with compliance.     Past Medical History  No past medical history on file.    Allergies:     Nkda [no known drug allergy]    Medications  No current outpatient medications on file.    Family History  Family History   Problem Relation Age of Onset    Diabetes Maternal Grandmother      Surgical History  No past surgical history on file.    Social History   Social History     Tobacco Use    Smoking status: Never    Smokeless tobacco: Never   Vaping Use    Vaping status: Never Used   Substance Use Topics    Alcohol use: Yes     Comment: occ    Drug use: No       ROS     General: No fevers, chills, night sweats, weight loss or gain.  Pulmonary: No shortness of breath, cough, sputum, or hemoptysis.  Cardiovascular: No chest pain, palpitations, or LE swelling.   GI: No nausea, vomiting, diarrhea, constipation, abdominal pain, hematochezia or melena.  : No dysuria, frequency, retention or hematuria.   Neuro:  "No headaches, no lightheadedness, no dizziness. No focal weakness, no general weakness.  Physical Exam     /81 (BP Location: Left arm, Patient Position: Sitting, BP Cuff Size: Adult)   Pulse 70   Temp 36.8 °C (98.3 °F) (Temporal)   Ht 1.626 m (5' 4\")   Wt 58.3 kg (128 lb 9.6 oz)   SpO2 96%   BMI 22.07 kg/m²     General: No acute distress, comfortable.   Head and Neck: NC/AT, EOMI, no scleral icterus or conjunctival pallor, no nasal discharge or oral erythema or exudates. Neck supple, no LADs.   CV: Rhythmic heart sounds, no murmurs, gallops or rubs.   Pulm: Chest expansion is symmetrical, no crackles, rales, rhonchi, or wheezing.  GI: Flat, non distended, soft, non tender. Defers  exam for now.   MSK: Normal ROM. No lower extremity edema. No lesions.   Neuro: Patient is alert and oriented x3. Speech is clear and fluent, goal directed. Is able to name, repeat and comprehend. Pupils equal, round and reactive to light. Good eye contact. Extra ocular movements intact. Facial and body symmetry without obvious focal deficit.   Psych: Appropriate mood and affect.     Diagnostic tests  I have reviewed all pertinent labs and diagnostic tests associated with this visit with specific comments listed under the assessment and plan below    Assessment and Plan    STD screening  Asymptomatic, defers  exam for now.  States no concerns for pregnancy.  -Check GC in genital swab (done in office)  -Check hepatitis C, HIV, hep C, syphilis    Birth control discussion  Compliance issues with hormone therapy, pelvic pain issues with IUDs.  Interested in Nexplanon, which requires a referral to a different office.  No contraindications for hormone therapy at this time.  -Referral to gynecology    Return in about 6 months (around 6/26/2025).    Lexus CALLEJAS PGY-3  Internal Medicine [facility]    Pt has been discussed with the Attending Physician    "

## 2024-12-26 NOTE — PATIENT INSTRUCTIONS
Get the blood work and the results will come to me right away if anything needs to be done I will let you know   You will get a call to schedule with women's health for birth control

## 2024-12-30 ENCOUNTER — TELEPHONE (OUTPATIENT)
Dept: INTERNAL MEDICINE | Facility: OTHER | Age: 29
End: 2024-12-30
Payer: COMMERCIAL

## 2024-12-30 DIAGNOSIS — Z11.3 SCREENING FOR STD (SEXUALLY TRANSMITTED DISEASE): ICD-10-CM

## 2024-12-30 RX ORDER — DOXYCYCLINE HYCLATE 100 MG
100 TABLET ORAL 2 TIMES DAILY
Qty: 14 TABLET | Refills: 0 | Status: SHIPPED | OUTPATIENT
Start: 2024-12-30

## 2024-12-31 NOTE — TELEPHONE ENCOUNTER
Called patient regarding results and plan of care.  Prescribe with course of doxycycline for 1 week and ordered pregnancy test as advised by Dr. Mejia.  Patient also advised to follow-up with PCP accordingly.

## 2025-01-29 ENCOUNTER — OFFICE VISIT (OUTPATIENT)
Dept: INTERNAL MEDICINE | Facility: OTHER | Age: 30
End: 2025-01-29
Payer: COMMERCIAL

## 2025-01-29 VITALS
SYSTOLIC BLOOD PRESSURE: 127 MMHG | DIASTOLIC BLOOD PRESSURE: 88 MMHG | HEIGHT: 64 IN | TEMPERATURE: 97.6 F | BODY MASS INDEX: 22.13 KG/M2 | WEIGHT: 129.6 LBS | OXYGEN SATURATION: 98 % | HEART RATE: 58 BPM

## 2025-01-29 DIAGNOSIS — A74.9 CHLAMYDIA INFECTION: ICD-10-CM

## 2025-01-29 DIAGNOSIS — Z11.3 SCREENING FOR STD (SEXUALLY TRANSMITTED DISEASE): ICD-10-CM

## 2025-01-29 ASSESSMENT — PATIENT HEALTH QUESTIONNAIRE - PHQ9: CLINICAL INTERPRETATION OF PHQ2 SCORE: 0

## 2025-01-30 NOTE — PROGRESS NOTES
"      Office Visit Follow up    Chief Complaint   Patient presents with    Follow-Up     STI retesting       Subjective   Ms. Mckeon is a 29-year-old female without medical history, who presents for follow up after STD screening. [place] visit she had had a sexual intercourse concerning for condom break, she was asymptomatic, but her results came back positive for Chlamydia. Treated with a course of doxycycline. She remains asymptomatic but she is concerned about eradication and about her partner so she would like to get retested. No dysuria, frequency, retention or hematuria, vaginal discharge, dyspareunia, pruritus or genital lesions.  Has not evidenced any lesions or discharge in her partner. Pending blood work.     ROS     General: No fevers, chills, night sweats, weight loss or gain.  Pulmonary: No shortness of breath, cough, sputum, or hemoptysis.  Cardiovascular: No chest pain, palpitations, or LE swelling.   GI: No nausea, vomiting, diarrhea, constipation, abdominal pain, hematochezia or melena.  Neuro: No headaches, no lightheadedness, no dizziness.   Psych: No anxiety or depression.     Physical Exam     /88 (BP Location: Left arm, Patient Position: Sitting, BP Cuff Size: Adult)   Pulse (!) 58   Temp 36.4 °C (97.6 °F) (Temporal)   Ht 1.626 m (5' 4\")   Wt 58.8 kg (129 lb 9.6 oz)   SpO2 98%   BMI 22.25 kg/m²     General: No acute distress, good interaction.   Head and Neck: NC/AT, EOMI, no scleral icterus or conjunctival pallor.  CV: Rhythmic heart sounds, no murmurs, gallops or rubs.   Pulm: Chest expansion is symmetrical, no crackles, rales, rhonchi, or wheezing.  GI: Flat, non distended, soft, non tender, normal bowel sounds.  MSK: Normal ROM. No lower extremity edema.   Neuro: Patient is alert and oriented x3. Speech is clear and fluent, goal directed. Pupils equal, round and reactive to light. Good eye contact. Extra ocular movements intact. Facial and body symmetry without obvious focal " deficits.  Psych: Appropriate mood and affect.     Diagnostic tests  I have reviewed all pertinent labs and diagnostic tests associated with this visit with specific comments listed under the assessment and plan below    Assessment and Plan    Chlamydia infection   STD screening  Asymptomatic.   -Retest with G/V NAAT urine   -Pending HIV/hepatitis panel  -Continued counseling regarding condom use  -Upcoming gynecology appt for checking on alternatives for birth control      Follow up as needed    Lexus CALLEJAS PGY-3  Internal Medicine [facility]    Pt has been seen and discussed with the Attending Physician

## 2025-03-28 ENCOUNTER — HOSPITAL ENCOUNTER (OUTPATIENT)
Facility: MEDICAL CENTER | Age: 30
End: 2025-03-28
Payer: COMMERCIAL

## 2025-03-28 ENCOUNTER — OFFICE VISIT (OUTPATIENT)
Dept: INTERNAL MEDICINE | Facility: OTHER | Age: 30
End: 2025-03-28
Payer: COMMERCIAL

## 2025-03-28 VITALS
HEIGHT: 64 IN | TEMPERATURE: 97.8 F | SYSTOLIC BLOOD PRESSURE: 106 MMHG | RESPIRATION RATE: 20 BRPM | BODY MASS INDEX: 22.02 KG/M2 | WEIGHT: 129 LBS | DIASTOLIC BLOOD PRESSURE: 74 MMHG | HEART RATE: 74 BPM | OXYGEN SATURATION: 98 %

## 2025-03-28 DIAGNOSIS — Z11.3 SCREENING EXAMINATION FOR STI: ICD-10-CM

## 2025-03-28 DIAGNOSIS — J02.9 SORE THROAT: ICD-10-CM

## 2025-03-28 DIAGNOSIS — Z86.19 HISTORY OF CHLAMYDIA INFECTION: ICD-10-CM

## 2025-03-28 LAB
HCV AB SER QL: NORMAL
HIV 1+2 AB+HIV1 P24 AG SERPL QL IA: NORMAL
S PYO DNA SPEC NAA+PROBE: NOT DETECTED
T PALLIDUM AB SER QL IA: NORMAL

## 2025-03-28 PROCEDURE — 87389 HIV-1 AG W/HIV-1&-2 AB AG IA: CPT

## 2025-03-28 PROCEDURE — 36415 COLL VENOUS BLD VENIPUNCTURE: CPT

## 2025-03-28 PROCEDURE — 86780 TREPONEMA PALLIDUM: CPT

## 2025-03-28 PROCEDURE — 86803 HEPATITIS C AB TEST: CPT

## 2025-03-28 ASSESSMENT — ENCOUNTER SYMPTOMS
FEVER: 0
WHEEZING: 0
DEPRESSION: 0
SHORTNESS OF BREATH: 0
DIZZINESS: 0
HEADACHES: 0
VOMITING: 0
PALPITATIONS: 0
MYALGIAS: 0
DOUBLE VISION: 0
SORE THROAT: 1
BLURRED VISION: 0
DIARRHEA: 0
NECK PAIN: 0
ABDOMINAL PAIN: 0
CHILLS: 0
COUGH: 0
STRIDOR: 0
SPUTUM PRODUCTION: 0

## 2025-03-28 NOTE — PROGRESS NOTES
Chief Complaint   Patient presents with    Pharyngitis     X 3 days     Referral Needed     ENT       HISTORY OF PRESENT ILLNESS: Patient is a 29 y.o. female established patient who presents today for the following     Patient is a 29-year-old female recent history of chlamydia infection she was seen at the end of January, a repeat gonorrhea/chlamydia was ordered however this completed.  She presents today with complaints of sore throat concerns of swollen tonsils for 3 days, she denies any recent sick contacts,  this has been a recurrent issue for years, she denies any fevers, chills, severe neck cough, sputum production, shortness of breath.  She does have 1 partner, she is sexually active.  She does also admit to having oral sex.. Denies any dysuria rashes, she is requesting STI testing.  She denies any trouble with eating or drinking.       History reviewed. No pertinent past medical history.    Patient Active Problem List    Diagnosis Date Noted    Family planning 10/10/2023    Acute cough 09/12/2023    Conjunctivitis 08/01/2023    Dysfunction of left eustachian tube 09/23/2022    Encounter for breast augmentation 03/28/2022    Irregular periods 06/04/2020    Segmental and somatic dysfunction of sacral region 02/11/2020    Encounter for postpartum care of lactating mother 07/17/2018       Allergies: Nkda [no known drug allergy]    No current outpatient medications on file.     No current facility-administered medications for this visit.       Social History     Tobacco Use    Smoking status: Never    Smokeless tobacco: Never   Vaping Use    Vaping status: Never Used   Substance Use Topics    Alcohol use: Yes     Comment: occ    Drug use: No       Family History   Problem Relation Age of Onset    Diabetes Maternal Grandmother          Review of Systems   Review of Systems   Constitutional:  Negative for chills and fever.   HENT:  Positive for sore throat.    Eyes:  Negative for blurred vision and double vision.  "  Respiratory:  Negative for cough, sputum production, shortness of breath, wheezing and stridor.    Cardiovascular:  Negative for chest pain and palpitations.   Gastrointestinal:  Negative for abdominal pain, diarrhea and vomiting.   Genitourinary:  Negative for dysuria and urgency.   Musculoskeletal:  Negative for myalgias and neck pain.   Neurological:  Negative for dizziness and headaches.   Psychiatric/Behavioral:  Negative for depression.        Exam:  /74   Pulse 74   Temp 36.6 °C (97.8 °F) (Temporal)   Resp 20   Ht 1.626 m (5' 4\")   Wt 58.5 kg (129 lb)   SpO2 98%  Body mass index is 22.14 kg/m².    Constitutional:  Not in acute distress, well appearing.  HEENT:   NC/AT.  Erythematous without evidence of exudates, does not appear swollen, no anterior neck/cervical lymph nodes palpable  Cardiovascular: Regular rate and rhythm. No murmurs or gallops.      Lungs:   Clear to auscultation bilaterally. No wheezes or crackles. No respiratory distress.  Abdomen: Not distended, soft, not tender. No guarding or rigidity. No masses.  Extremities:  No cyanosis/clubbing/edema. No obvious deformities.  Skin:  Warm and dry.  No visible rashes.  Neurologic: Alert & oriented x 3, CN II-XII grossly intact, strength and sensation grossly intact.  No focal deficits noted.  Psychiatric:  Affect normal, mood normal, judgment normal.    Assessment/Plan:     1. History of chlamydia infection  2. Screening examination for STI  She has a recent history asymptomatic positive chlamydia at the end of December with a course of doxycyline  She has not had repeat testing done.  Denies any symptoms at this time.  She denies any symptoms with the liver is requesting full STI screening  - Chlamydia/GC, PCR (Genital/Anal swab); Future  - Chlamydia & N. Gonorrhoeae By PCR; Future  - HIV AG/AB Combo Assay Screening; Future  - T.Pallidum AB GRACE (Screening); Future  - Hepatitis C Virus Antibody; Future    3. Sore throat  Unclear " etiology, as this has been a chronic issue.  She denies any recent sick contacts.  Strep test was negative.  Her tonsils do appear erythematous however not exudates at this time.  Will refer to ENT will possible need of tonsillectomy  - POCT CEPHEID GROUP A STREP - PCR  - Ref to ENT      All imaging results and lab results and consult notes are reviewed at this visit.  Followup: No follow-ups on file.    Please note that this dictation was created using voice recognition software. I have made every reasonable attempt to correct obvious errors, but I expect that there are errors of grammar and possibly content that I did not discover before finalizing the note.    Rossi Olivia, DO  PGY-2  Internal Medicine

## 2025-03-28 NOTE — PROGRESS NOTES
Teaching Physician Attestation      Level of Participation    I discussed with the resident physician the patient's history, exam, assessment and plan in detail.  Topics listed in my addendum were the focus of the visit.  Healthcare maintenance was not addressed this visit unless listed as a topic in my addendum.  I agree with plan as written along with the following additions/modifications:    Sore throat (recurrent), etiology unclear  -eat/drink ok.  -no f/cough/exudate/lad, but does have children at home  -Has oral sex exposure, history of chlamydia  -Swab for gonorrhea/chlamydia, also strep (POC swab neg), offered COVID if patient is interested.  ENT referral given is recurrent, appreciate support    Hx chlamydia  -s/p doxy, no sx.  Repeat swab to ensure clearance particular given above symptoms.  Check hiv/syphilis/hep c also.    Return to clinic when PCP returns.  pcr

## 2025-03-28 NOTE — PATIENT INSTRUCTIONS
Please follow up for your labs before your next visit.  Thank you for visiting me today at the [facility].   Please follow up in 2 weeks.

## 2025-03-31 ENCOUNTER — TELEPHONE (OUTPATIENT)
Dept: INTERNAL MEDICINE | Facility: OTHER | Age: 30
End: 2025-03-31
Payer: COMMERCIAL

## 2025-03-31 NOTE — TELEPHONE ENCOUNTER
Lab called, specimen for Throat and vaginal swab was inconclusive. Spoke to patient, patient is ok with coming for a recollect. Per Dr. Escobedo he would like to see patient for further evaluation on diagnoses. Patient will be following up with [place] office on Thursday (4-3-2025).

## 2025-04-03 ENCOUNTER — OFFICE VISIT (OUTPATIENT)
Dept: INTERNAL MEDICINE | Facility: OTHER | Age: 30
End: 2025-04-03
Payer: COMMERCIAL

## 2025-04-03 VITALS
TEMPERATURE: 97.2 F | HEART RATE: 64 BPM | SYSTOLIC BLOOD PRESSURE: 117 MMHG | OXYGEN SATURATION: 97 % | DIASTOLIC BLOOD PRESSURE: 82 MMHG

## 2025-04-03 DIAGNOSIS — Z11.3 SCREENING EXAMINATION FOR STI: Primary | ICD-10-CM

## 2025-04-03 PROCEDURE — 3079F DIAST BP 80-89 MM HG: CPT | Mod: GC

## 2025-04-03 PROCEDURE — 3074F SYST BP LT 130 MM HG: CPT | Mod: GC

## 2025-04-03 PROCEDURE — 99213 OFFICE O/P EST LOW 20 MIN: CPT | Mod: GC

## 2025-04-03 ASSESSMENT — ENCOUNTER SYMPTOMS
NAUSEA: 0
DIZZINESS: 0
BACK PAIN: 0
SORE THROAT: 0
VOMITING: 0
BLURRED VISION: 0
ABDOMINAL PAIN: 0
CHILLS: 0
WEIGHT LOSS: 0
DIARRHEA: 0
SHORTNESS OF BREATH: 0
CONSTIPATION: 0
COUGH: 0
PALPITATIONS: 0
HEADACHES: 0
SPUTUM PRODUCTION: 0
MYALGIAS: 0
FEVER: 0
DOUBLE VISION: 0
WEAKNESS: 0
NERVOUS/ANXIOUS: 0

## 2025-04-03 ASSESSMENT — LIFESTYLE VARIABLES: SUBSTANCE_ABUSE: 0

## 2025-04-03 NOTE — Clinical Note
REFERRAL APPROVAL NOTICE         Sent on April 3, 2025                   Vika Mckeon  [address]  [place] [address]                   Dear Ms. Mckeon,    After a careful review of the medical information and benefit coverage, [facility] has processed your referral. See below for additional details.    If applicable, you must be actively enrolled with your insurance for coverage of the authorized service. If you have any questions regarding your coverage, please contact your insurance directly.    REFERRAL INFORMATION   Referral #:  [id number]  Referred-To Provider    Referred-By Provider:  Otolaryngology    [facility]      [address]  [place] [address]  [phone] [address] [facility] [place] [address]  [phone]    Referral Start Date:  03/28/2025  Referral End Date:   03/28/2026             SCHEDULING  If you do not already have an appointment, please call [phone] to make an appointment.     MORE INFORMATION  If you do not already have a [employer] account, sign up at: [email or web address]  You can access your medical information, make appointments, see lab results, billing information, and more.  If you have questions regarding this referral, please contact  the [facility] Referrals department at:             [phone]. Monday - Friday 8:00AM - 5:00PM.     Sincerely,    [facility]

## 2025-04-03 NOTE — PATIENT INSTRUCTIONS
Thank you for coming today!   Please remember a balanced lifestyle helps to keep your heart healthy. Some tips for a healthy heart include:  - Exercise for 30 minutes, at least 5 times a week or 1 hour 3 times a week.   - Avoid fatty, processed, sweetened food.   - Reduce alcohol intake.   - Do not smoke.

## 2025-04-03 NOTE — PROGRESS NOTES
Established Patient    Patient Care Team:  Lexus Mejia M.D. as PCP - General (Internal Medicine)    HPI:  Vika Mckeon is a 29 y.o. female with relevant past medical history of chlamydia infection who presents today for follow-up.    Chief Complaint   Patient presents with    Sexually Transmitted Diseases     Throat and vaginal swab      Patient came for a follow-up on STI results.  HIV, syphilis, hepatitis c were negative.  Throat swab and vaginal swab samples for chlamydia and gonorrhea were lost.  Will need new test for chlamydia gonorrhea rule out.    Review of Systems   Constitutional:  Negative for chills, fever, malaise/fatigue and weight loss.   HENT:  Negative for congestion and sore throat.    Eyes:  Negative for blurred vision and double vision.   Respiratory:  Negative for cough, sputum production and shortness of breath.    Cardiovascular:  Negative for chest pain, palpitations and leg swelling.   Gastrointestinal:  Negative for abdominal pain, constipation, diarrhea, nausea and vomiting.   Genitourinary:  Negative for dysuria.   Musculoskeletal:  Negative for back pain, joint pain and myalgias.   Neurological:  Negative for dizziness, weakness and headaches.   Psychiatric/Behavioral:  Negative for substance abuse. The patient is not nervous/anxious.    :    No past medical history on file.    Social History     Tobacco Use    Smoking status: Never    Smokeless tobacco: Never   Vaping Use    Vaping status: Never Used   Substance Use Topics    Alcohol use: Yes     Comment: occ    Drug use: No       No current outpatient medications on file.     No current facility-administered medications for this visit.       /82 (BP Location: Left arm, Patient Position: Sitting, BP Cuff Size: Adult)   Pulse 64   Temp 36.2 °C (97.2 °F) (Temporal)   SpO2 97%   Physical Exam  Constitutional:       General: She is not in acute distress.     Appearance: Normal appearance. She is not ill-appearing.    HENT:      Right Ear: Tympanic membrane normal.      Left Ear: Tympanic membrane normal.      Nose: Nose normal.      Mouth/Throat:      Mouth: Mucous membranes are moist.   Eyes:      Extraocular Movements: Extraocular movements intact.      Conjunctiva/sclera: Conjunctivae normal.      Pupils: Pupils are equal, round, and reactive to light.   Cardiovascular:      Rate and Rhythm: Normal rate and regular rhythm.      Pulses: Normal pulses.      Heart sounds: Normal heart sounds. No murmur heard.  Pulmonary:      Effort: Pulmonary effort is normal. No respiratory distress.      Breath sounds: Normal breath sounds.   Chest:      Chest wall: No tenderness.   Abdominal:      General: Abdomen is flat. Bowel sounds are normal. There is no distension.      Palpations: Abdomen is soft.      Tenderness: There is no abdominal tenderness. There is no right CVA tenderness or left CVA tenderness.   Musculoskeletal:         General: No swelling or tenderness. Normal range of motion.      Cervical back: Normal range of motion and neck supple. No rigidity.      Right lower leg: No edema.      Left lower leg: No edema.   Skin:     General: Skin is warm.      Coloration: Skin is not jaundiced or pale.   Neurological:      General: No focal deficit present.      Mental Status: She is alert and oriented to person, place, and time.      Cranial Nerves: No cranial nerve deficit.      Sensory: No sensory deficit.      Motor: No weakness.         Assessment and Plan:     History of chlamydia infection  Screening examination for STI  She has a recent history asymptomatic positive chlamydia at the end of December with a course of doxycyline  She has not had repeat testing done.  Denies any symptoms at this time.  She has 1 sexual partner for the past 6 months. Last  HIV, syphilis, hepatitis c were negative.  Throat exam and pelvic exam were not concerning for STI.  At this time patient does not need throat swab or vaginal swab.  -  Chlamydia/GC, PCR (Urine); Future       Return in about 6 months (around 10/3/2025).        This note was created using voice recognition software.  While every attempt is made to ensure accuracy of transcription, occasionally errors occur.    Jazmín Mchugh MD  Internal Medicine PGY-2

## 2025-06-18 ENCOUNTER — OFFICE VISIT (OUTPATIENT)
Dept: INTERNAL MEDICINE | Facility: OTHER | Age: 30
End: 2025-06-18
Payer: COMMERCIAL

## 2025-06-18 VITALS
TEMPERATURE: 98.1 F | WEIGHT: 130.6 LBS | HEIGHT: 64 IN | SYSTOLIC BLOOD PRESSURE: 126 MMHG | HEART RATE: 87 BPM | BODY MASS INDEX: 22.3 KG/M2 | DIASTOLIC BLOOD PRESSURE: 80 MMHG | OXYGEN SATURATION: 98 %

## 2025-06-18 DIAGNOSIS — R30.0 DYSURIA: ICD-10-CM

## 2025-06-18 DIAGNOSIS — N89.8 VAGINAL DISCHARGE: Primary | ICD-10-CM

## 2025-06-18 DIAGNOSIS — N94.10 DYSPAREUNIA IN FEMALE: ICD-10-CM

## 2025-06-18 PROBLEM — Z41.1 ENCOUNTER FOR BREAST AUGMENTATION: Status: RESOLVED | Noted: 2022-03-28 | Resolved: 2025-06-18

## 2025-06-18 PROBLEM — Z30.09 FAMILY PLANNING: Status: RESOLVED | Noted: 2023-10-10 | Resolved: 2025-06-18

## 2025-06-18 LAB
APPEARANCE UR: NORMAL
BILIRUB UR STRIP-MCNC: NEGATIVE MG/DL
COLOR UR AUTO: YELLOW
GLUCOSE UR STRIP.AUTO-MCNC: NEGATIVE MG/DL
KETONES UR STRIP.AUTO-MCNC: NEGATIVE MG/DL
LEUKOCYTE ESTERASE UR QL STRIP.AUTO: NORMAL
NITRITE UR QL STRIP.AUTO: NEGATIVE
PH UR STRIP.AUTO: 6 [PH] (ref 5–8)
POCT INT CON NEG: NEGATIVE
POCT INT CON POS: POSITIVE
POCT URINE PREGNANCY TEST: NEGATIVE
PROT UR QL STRIP: NEGATIVE MG/DL
RBC UR QL AUTO: NORMAL
SP GR UR STRIP.AUTO: 1.02
UROBILINOGEN UR STRIP-MCNC: NORMAL MG/DL

## 2025-06-18 PROCEDURE — 3074F SYST BP LT 130 MM HG: CPT | Mod: GC

## 2025-06-18 PROCEDURE — 81025 URINE PREGNANCY TEST: CPT | Mod: GC

## 2025-06-18 PROCEDURE — 3079F DIAST BP 80-89 MM HG: CPT | Mod: GC

## 2025-06-18 PROCEDURE — 81002 URINALYSIS NONAUTO W/O SCOPE: CPT | Mod: GC

## 2025-06-18 PROCEDURE — 99214 OFFICE O/P EST MOD 30 MIN: CPT | Mod: GC

## 2025-06-18 NOTE — PROGRESS NOTES
"    Established Patient - Acute concern    Patient Care Team:  Katelyn Manjarrez M.D. as PCP - General (Internal Medicine)    Today's visit and plan was discussed with attending physician, Dr. Freitas.    Chief Complaint   Patient presents with    UTI     Re-occurring UTI patient reports on and off for the last two months. Patient is requesting to get tested and reports she is currently having discharge which she has never had with a UTI        HPI:  Vika Mckeon is a 29 y.o. female with minimal medical problems who presents today for evaluation of new malodorous vaginal discharge.    In December she was checked for STIs and she was positive for chlamydia and treated accordingly. She denied symptoms at the time but was tested because she had just started dating a new partner.    Since Sunday, she started having vaginal discharge. Appearance is dark yellowish and sometimes tinged with red and malodorous. Looked similar to her discharge before starting her period, however, timing is not consistent with her menstrual cycle. Her discharge does not usually have an odor. Took Plan B 2 weeks ago. Last menstrual cycle ended May 24th. She has some dyspareunia since the discharge started like a \"scratching sensation.\" No pain while urinating, however, has some pain just after urinating and feels that it is in vaginal canal. No pelvic or abdominal pain, fevers or chills.    Review of systems completed, including 10 pertinent systems, with all other systems negative unless noted above.     Past Medical History[1]    Social History[2]    Current Medications[3]    /80 (BP Location: Left arm, Patient Position: Sitting, BP Cuff Size: Small adult)   Pulse 87   Temp 36.7 °C (98.1 °F) (Temporal)   Ht 1.626 m (5' 4\")   Wt 59.2 kg (130 lb 9.6 oz)   SpO2 98%   BMI 22.42 kg/m²     PHYSICAL EXAM:   General: No acute distress, good interaction.   Pelvic exam: no external lesions or growths on labia or vaginal vault. Vaginal " canal without excoriations or lesions. Moderate white discharge visible. Swab obtained.      Assessment and Plan:   1. Vaginal discharge  2. Dyspareunia in female  3. Dysuria  Pregnancy test negative in office. UA unremarkable. Possible that discharge related to transient change in hormones, however, given history of STI and her concern will screen accordingly.  - POCT Urinalysis  - POCT PREGNANCY  - HIV AG/AB Combo Assay Screening; Future  - T.Pallidum AB GRACE (Screening); Future  - BV+CT/NG/TV CHI+YEAST CULTURE      Return in about 3 months (around 9/18/2025) for reestablishing.      Katelyn Manjarrez M.D., PGY-2 Internal Medicine  [facility]    This note was created using voice recognition software.  While every attempt is made to ensure accuracy of transcription, occasionally errors occur.         [1] History reviewed. No pertinent past medical history.  [2]   Social History  Tobacco Use    Smoking status: Never    Smokeless tobacco: Never   Vaping Use    Vaping status: Never Used   Substance Use Topics    Alcohol use: Yes     Comment: rare    Drug use: No   [3]   No current outpatient medications on file.     No current facility-administered medications for this visit.

## 2025-06-22 PROBLEM — H10.9 CONJUNCTIVITIS: Status: RESOLVED | Noted: 2023-08-01 | Resolved: 2025-06-22

## 2025-06-22 PROBLEM — R05.1 ACUTE COUGH: Status: RESOLVED | Noted: 2023-09-12 | Resolved: 2025-06-22

## 2025-06-22 PROBLEM — N92.6 IRREGULAR PERIODS: Status: RESOLVED | Noted: 2020-06-04 | Resolved: 2025-06-22

## 2025-06-23 ENCOUNTER — RESULTS FOLLOW-UP (OUTPATIENT)
Dept: INTERNAL MEDICINE | Facility: OTHER | Age: 30
End: 2025-06-23

## 2025-06-23 ENCOUNTER — TELEPHONE (OUTPATIENT)
Dept: INTERNAL MEDICINE | Facility: OTHER | Age: 30
End: 2025-06-23
Payer: COMMERCIAL

## 2025-06-24 ENCOUNTER — OFFICE VISIT (OUTPATIENT)
Dept: URGENT CARE | Facility: CLINIC | Age: 30
End: 2025-06-24
Payer: COMMERCIAL

## 2025-06-24 VITALS
RESPIRATION RATE: 14 BRPM | DIASTOLIC BLOOD PRESSURE: 78 MMHG | HEART RATE: 61 BPM | HEIGHT: 63 IN | TEMPERATURE: 96.9 F | OXYGEN SATURATION: 99 % | WEIGHT: 129.6 LBS | BODY MASS INDEX: 22.96 KG/M2 | SYSTOLIC BLOOD PRESSURE: 112 MMHG

## 2025-06-24 DIAGNOSIS — N76.0 BV (BACTERIAL VAGINOSIS): ICD-10-CM

## 2025-06-24 DIAGNOSIS — B96.89 BV (BACTERIAL VAGINOSIS): ICD-10-CM

## 2025-06-24 DIAGNOSIS — A54.9 GONORRHEA: ICD-10-CM

## 2025-06-24 RX ORDER — METRONIDAZOLE 500 MG/1
500 TABLET ORAL 2 TIMES DAILY
Qty: 14 TABLET | Refills: 0 | Status: SHIPPED | OUTPATIENT
Start: 2025-06-24 | End: 2025-07-01

## 2025-06-24 ASSESSMENT — ENCOUNTER SYMPTOMS
EYES NEGATIVE: 1
NEUROLOGICAL NEGATIVE: 1
CONSTITUTIONAL NEGATIVE: 1

## 2025-06-24 NOTE — TELEPHONE ENCOUNTER
"Called Vika in hopes of notifying her of her positive gonorrhea result verbally. No answer. Left voicemail this afternoon with minimal personal details to let her know that she should check her [facility] messages regarding her vaginal swab results.    Message sent:  \"Hi Vika,     You have tested positive for gonorrhea. I will send a prescription for a one-time shot of ceftriaxone (type of antibiotic).      It is very important that you discuss this result with anyone you have been sexually active with in the last 60 days. They should be tested and treated if positive or else you will likely be reinfected.     Please obtain the remainder of your STI screening which can be done at any lab for blood draw.     Thank you,  Dr. Manjarrez\"    Spoke to pharmacist. Unable to provide ceftriaxone shots in the pharmacy. Called patient and left voicemail again to recommend urgent care or emergency department visit for treatment. Will relay this information in [facility] as well.  "

## 2025-06-24 NOTE — PROGRESS NOTES
"Maribel Mckeon is a very pleasant 29 y.o. female who presents with Sexually Transmitted Diseases (Patient states that she was sent over here for treatment for BV and gonorrhea. )            HPI  Patient tested positive for gonorrhea and BV.  Sent here by PCP for treatment as they do not have Rocephin IM in clinic.  Patient completely asymptomatic with no concerns today.      PMH:  has a past medical history of Conjunctivitis (08/01/2023) and Irregular periods (06/04/2020).  MEDS: Current Medications[1]  ALLERGIES: Allergies[2]  SURGHX: Past Surgical History[3]  SOCHX:  reports that she has never smoked. She has never used smokeless tobacco. She reports current alcohol use. She reports that she does not use drugs.  FH: family history includes Diabetes in her maternal grandmother.        Review of Systems   Constitutional: Negative.    Eyes: Negative.    Genitourinary: Negative.    Skin: Negative.    Neurological: Negative.        Medications, Allergies, and current problem list reviewed today in Epic           Objective     /78   Pulse 61   Temp 36.1 °C (96.9 °F) (Temporal)   Resp 14   Ht 1.6 m (5' 3\")   Wt 58.8 kg (129 lb 9.6 oz)   SpO2 99%   BMI 22.96 kg/m²      Physical Exam  Vitals and nursing note reviewed.   Constitutional:       General: She is not in acute distress.     Appearance: Normal appearance. She is not ill-appearing, toxic-appearing or diaphoretic.   HENT:      Head: Normocephalic and atraumatic.      Right Ear: External ear normal.      Left Ear: External ear normal.      Nose: Nose normal.   Eyes:      Conjunctiva/sclera: Conjunctivae normal.   Cardiovascular:      Rate and Rhythm: Normal rate and regular rhythm.      Heart sounds: Normal heart sounds.   Pulmonary:      Effort: Pulmonary effort is normal. No respiratory distress.      Breath sounds: Normal breath sounds. No wheezing, rhonchi or rales.   Abdominal:      General: Abdomen is flat. There is no " distension.      Palpations: Abdomen is soft.      Tenderness: There is no abdominal tenderness. There is no right CVA tenderness, left CVA tenderness, guarding or rebound.      Comments: No suprapubic tenderness   Musculoskeletal:      Cervical back: Normal range of motion and neck supple.   Skin:     General: Skin is warm and dry.   Neurological:      General: No focal deficit present.      Mental Status: She is alert and oriented to person, place, and time. Mental status is at baseline.   Psychiatric:         Mood and Affect: Mood normal.         Behavior: Behavior normal.         Thought Content: Thought content normal.         Judgment: Judgment normal.                                  Assessment & Plan  Gonorrhea  Very pleasant 29-year-old female presenting for treatment of gonorrhea and BV.  Outside testing completed and reviewed by me.  Proper treatment ordered and administered in clinic.  Safe sexual practices discussed at length, no intercourse for 3 weeks.  Repeat testing in 4 to 6 weeks.    Orders:    cefTRIAXone (Rocephin) 500 mg in lidocaine (Xylocaine) 1 % 2 mL for IM use    BV (bacterial vaginosis)  Start probiotic, do not drink alcohol    Orders:    metroNIDAZOLE (FLAGYL) 500 MG Tab; Take 1 Tablet by mouth 2 times a day for 7 days.         I personally reviewed prior external notes and test results pertinent to today's visit. Return to clinic or go to ED if symptoms worsen or persist. Red flag symptoms and indications for ED discussed at length. Patient/Parent/Guardian voices understanding.  AVS with post-visit instructions printed and provided or given verbally.  Follow-up with your primary care provider in 3-5 days. All side effects and potential interactions of prescribed medication discussed including allergic response, GI upset, tendon injury, rash, sedation, OCP effectiveness, etc.    Please note that this dictation was created using voice recognition software. I have made every reasonable  attempt to correct obvious errors, but I expect that there are errors of grammar and possibly content that I did not discover before finalizing the note.               [1]   Current Outpatient Medications:     metroNIDAZOLE (FLAGYL) 500 MG Tab, Take 1 Tablet by mouth 2 times a day for 7 days., Disp: 14 Tablet, Rfl: 0  [2]   Allergies  Allergen Reactions    Nkda [No Known Drug Allergy]    [3] No past surgical history on file.

## 2025-07-01 ENCOUNTER — OFFICE VISIT (OUTPATIENT)
Dept: INTERNAL MEDICINE | Facility: OTHER | Age: 30
End: 2025-07-01
Payer: COMMERCIAL

## 2025-07-01 VITALS
DIASTOLIC BLOOD PRESSURE: 80 MMHG | HEIGHT: 63 IN | BODY MASS INDEX: 23.14 KG/M2 | WEIGHT: 130.6 LBS | HEART RATE: 71 BPM | TEMPERATURE: 98.3 F | OXYGEN SATURATION: 95 % | SYSTOLIC BLOOD PRESSURE: 125 MMHG

## 2025-07-01 DIAGNOSIS — J03.90 TONSILLITIS: Primary | ICD-10-CM

## 2025-07-01 PROCEDURE — 1126F AMNT PAIN NOTED NONE PRSNT: CPT

## 2025-07-01 PROCEDURE — 3074F SYST BP LT 130 MM HG: CPT

## 2025-07-01 PROCEDURE — 99213 OFFICE O/P EST LOW 20 MIN: CPT | Mod: GE

## 2025-07-01 PROCEDURE — 3079F DIAST BP 80-89 MM HG: CPT

## 2025-07-01 ASSESSMENT — ENCOUNTER SYMPTOMS
WEIGHT LOSS: 0
DEPRESSION: 0
FEVER: 0
ABDOMINAL PAIN: 0
PALPITATIONS: 0
GASTROINTESTINAL NEGATIVE: 1
SINUS PAIN: 0
HALLUCINATIONS: 0
MUSCULOSKELETAL NEGATIVE: 1
CHILLS: 0
CONSTITUTIONAL NEGATIVE: 1
TINGLING: 0
NEUROLOGICAL NEGATIVE: 1
MYALGIAS: 0
HEARTBURN: 0
DIZZINESS: 0
RESPIRATORY NEGATIVE: 1
ORTHOPNEA: 0
PSYCHIATRIC NEGATIVE: 1
SPUTUM PRODUCTION: 0
SORE THROAT: 1
HEADACHES: 0
CARDIOVASCULAR NEGATIVE: 1
HEMOPTYSIS: 0
COUGH: 0

## 2025-07-01 ASSESSMENT — PAIN SCALES - GENERAL: PAINLEVEL_OUTOF10: NO PAIN

## 2025-07-01 NOTE — PROGRESS NOTES
"    Chief Complaint   Patient presents with    Referral Needed     Referral ENT tonsil     HPI: Vika Mckeon is a 29 y.o. female here complaining of persistent episodes of tonsillitis, happening at least 1 week/month.  No current fever/chills, no shortness of breath, no alarm signs.  No history of allergies.  Currently receiving metronidazole for STD.     Vitals:    07/01/25 1602   BP: 125/80   BP Location: Left arm   Patient Position: Sitting   BP Cuff Size: Adult   Pulse: 71   Temp: 36.8 °C (98.3 °F)   TempSrc: Temporal   SpO2: 95%   Weight: 59.2 kg (130 lb 9.6 oz)   Height: 1.6 m (5' 3\")   Body mass index is 23.13 kg/m².    Review of Systems   Constitutional: Negative.  Negative for chills, fever and weight loss.   HENT:  Positive for sore throat. Negative for ear pain, sinus pain and tinnitus.    Respiratory: Negative.  Negative for cough, hemoptysis and sputum production.    Cardiovascular: Negative.  Negative for chest pain, palpitations and orthopnea.   Gastrointestinal: Negative.  Negative for abdominal pain and heartburn.   Genitourinary: Negative.  Negative for dysuria.   Musculoskeletal: Negative.  Negative for myalgias.   Skin: Negative.  Negative for rash.   Neurological: Negative.  Negative for dizziness, tingling and headaches.   Endo/Heme/Allergies: Negative.    Psychiatric/Behavioral: Negative.  Negative for depression, hallucinations and suicidal ideas.    All other systems reviewed and are negative.     Physical Exam  Vitals and nursing note reviewed.   Constitutional:       General: She is not in acute distress.     Appearance: Normal appearance. She is not ill-appearing, toxic-appearing or diaphoretic.   HENT:      Mouth/Throat:      Mouth: Mucous membranes are moist.      Comments: Cobblestoning  Cardiovascular:      Rate and Rhythm: Normal rate and regular rhythm.      Pulses: Normal pulses.      Heart sounds: Normal heart sounds. No murmur heard.  Pulmonary:      Effort: Pulmonary " effort is normal. No respiratory distress.      Breath sounds: Normal breath sounds. No stridor. No wheezing or rales.   Abdominal:      General: Bowel sounds are normal. There is no distension.      Palpations: Abdomen is soft.      Tenderness: There is no abdominal tenderness. There is no guarding.   Musculoskeletal:         General: No swelling or tenderness. Normal range of motion.      Right lower leg: No edema.      Left lower leg: No edema.   Skin:     General: Skin is warm.      Capillary Refill: Capillary refill takes less than 2 seconds.      Coloration: Skin is not jaundiced or pale.      Findings: No bruising.   Neurological:      General: No focal deficit present.      Mental Status: She is alert. Mental status is at baseline.      Cranial Nerves: No cranial nerve deficit.      Sensory: No sensory deficit.      Coordination: Coordination normal.      Gait: Gait normal.      Deep Tendon Reflexes: Reflexes normal.   Psychiatric:         Mood and Affect: Mood normal.         Behavior: Behavior normal.         Thought Content: Thought content normal.         Judgment: Judgment normal.         Vika Mckeon is a 29 y.o. female here for recurrent tonsillitis.  Referral placed.    Assessment & Plan  Tonsillitis  Multiple episodes, at least 1 week every month  No alarm signs  No prior history of allergies but cobblestoning on physical examination  Interested in surgical management    Orders Placed This Encounter    Referral to ENT     Return if symptoms worsen or fail to improve.     Armando R. Reyes Yparraguirre, M.D.  Internal Medicine Senior Resident   [facility]            This note was generated using voice recognition software which has a small chance of producing errors of grammar and possibly content. I have made every reasonable attempt to find and correct any obvious errors but expect that some may not be found prior to finalization of this note.

## 2025-07-09 NOTE — Clinical Note
REFERRAL APPROVAL NOTICE         Sent on July 9, 2025                   Vika Mckeon  [address]  [place] [address]                   Dear Ms. Mckeon,    After a careful review of the medical information and benefit coverage, [facility] has processed your referral. See below for additional details.    If applicable, you must be actively enrolled with your insurance for coverage of the authorized service. If you have any questions regarding your coverage, please contact your insurance directly.    REFERRAL INFORMATION   Referral #:  [id number]  Referred-To Provider    Referred-By Provider:  Otolaryngology    Armando R Reyes Yparraguirre, M.D.   [facility]      [address] [facility] [place] [id number] [phone] [address] [facility] [place] [address]  [phone]    Referral Start Date:  07/01/2025  Referral End Date:   07/01/2026             SCHEDULING  If you do not already have an appointment, please call [phone] to make an appointment.     MORE INFORMATION  If you do not already have a [facility] account, sign up at: [email or web address]  You can access your medical information, make appointments, see lab results, billing information, and more.  If you have questions regarding this referral, please contact  the [facility] Referrals department at:             [phone]. Monday - Friday 8:00AM - 5:00PM.     Sincerely,    [facility]

## 2025-07-18 ENCOUNTER — OFFICE VISIT (OUTPATIENT)
Dept: INTERNAL MEDICINE | Facility: OTHER | Age: 30
End: 2025-07-18
Payer: COMMERCIAL

## 2025-07-18 ENCOUNTER — HOSPITAL ENCOUNTER (OUTPATIENT)
Facility: MEDICAL CENTER | Age: 30
End: 2025-07-18
Payer: COMMERCIAL

## 2025-07-18 VITALS
SYSTOLIC BLOOD PRESSURE: 104 MMHG | OXYGEN SATURATION: 97 % | HEART RATE: 68 BPM | HEIGHT: 63 IN | WEIGHT: 128.4 LBS | DIASTOLIC BLOOD PRESSURE: 73 MMHG | TEMPERATURE: 97.8 F | BODY MASS INDEX: 22.75 KG/M2

## 2025-07-18 DIAGNOSIS — B96.89 BACTERIAL VAGINOSIS: ICD-10-CM

## 2025-07-18 DIAGNOSIS — N76.0 BACTERIAL VAGINOSIS: ICD-10-CM

## 2025-07-18 DIAGNOSIS — A54.9 GONORRHEA IN FEMALE: ICD-10-CM

## 2025-07-18 DIAGNOSIS — N92.6 MISSED PERIOD: Primary | ICD-10-CM

## 2025-07-18 LAB
POCT INT CON NEG: NEGATIVE
POCT INT CON POS: POSITIVE
POCT URINE PREGNANCY TEST: NEGATIVE

## 2025-07-18 PROCEDURE — 3074F SYST BP LT 130 MM HG: CPT | Mod: GC

## 2025-07-18 PROCEDURE — 87491 CHLMYD TRACH DNA AMP PROBE: CPT

## 2025-07-18 PROCEDURE — 3078F DIAST BP <80 MM HG: CPT | Mod: GC

## 2025-07-18 PROCEDURE — 81025 URINE PREGNANCY TEST: CPT | Mod: GC

## 2025-07-18 PROCEDURE — 87591 N.GONORRHOEAE DNA AMP PROB: CPT

## 2025-07-18 PROCEDURE — 99214 OFFICE O/P EST MOD 30 MIN: CPT | Mod: GC

## 2025-07-18 ASSESSMENT — ENCOUNTER SYMPTOMS
FEVER: 0
MYALGIAS: 0
DIARRHEA: 0
BLURRED VISION: 0
CHILLS: 0
ABDOMINAL PAIN: 0
DOUBLE VISION: 0
CONSTIPATION: 0
NAUSEA: 0
LOSS OF CONSCIOUSNESS: 0
SHORTNESS OF BREATH: 0
SORE THROAT: 0
COUGH: 0
DEPRESSION: 0
HEARTBURN: 0
WHEEZING: 0
WEAKNESS: 0
DIZZINESS: 0
VOMITING: 0

## 2025-07-18 NOTE — PROGRESS NOTES
ESTABLISHED PATIENT VISIT    PATIENT ID:  Name: Vika Mckeon  YOB: 1995  Patient Care Team:  Katelyn Manjarrez M.D. as PCP - General (Internal Medicine)    CHIEF COMPLAINT(s):    1.  Missed Periods  2.  Testing for gonorrhea chlamydia, bacterial vaginosis and after treatment received 2 months back.    History of Present Illness:    This is a pleasant 29 y.o. female clinic patient previously established with my colleague Dr. Manjarrez, who presents today for the chief complaint of not having periods of last 2 months, associated with mild fatigue and nausea.  She has normal menstrual cycles otherwise with LMP on 5/17/2025.  Patient is sexually active and not on any form of contraception.  Uses condoms only.  She does not have any other associated symptoms, no other complaints this visit.  She has received treatment for gonorrhea and bacterial vaginosis in 5/25.  Received treatment for chlamydia last December,2024.  She does not have any significant past medical history except being treated for multiple episodes of vaginal candidiasis, bacterial vaginosis ,STDs.        Patient Active Problem List    Diagnosis Date Noted    Dysfunction of left eustachian tube 09/23/2022    Segmental and somatic dysfunction of sacral region 02/11/2020         Review of Systems   Constitutional:  Negative for chills, fever and malaise/fatigue.   HENT:  Negative for congestion, hearing loss and sore throat.    Eyes:  Negative for blurred vision and double vision.   Respiratory:  Negative for cough, shortness of breath and wheezing.    Cardiovascular:  Negative for chest pain and leg swelling.   Gastrointestinal:  Negative for abdominal pain, constipation, diarrhea, heartburn, nausea and vomiting.   Genitourinary:  Negative for dysuria and frequency.   Musculoskeletal:  Negative for myalgias.   Skin:  Negative for itching and rash.   Neurological:  Negative for dizziness, loss of consciousness and weakness.  "  Psychiatric/Behavioral:  Negative for depression.        Past Medical History[1]  Past Surgical History[2]  Family History   Problem Relation Age of Onset    Diabetes Maternal Grandmother      Nkda [no known drug allergy]  Social History[3]  Current Medications[4]    OBJECTIVE:  /73 (BP Location: Left arm, Patient Position: Sitting, BP Cuff Size: Adult)   Pulse 68   Temp 36.6 °C (97.8 °F) (Temporal)   Ht 1.6 m (5' 3\")   Wt 58.2 kg (128 lb 6.4 oz)   LMP 05/24/2025 (Exact Date)   SpO2 97%   BMI 22.75 kg/m²   Physical Exam  Vitals and nursing note reviewed.   Constitutional:       General: She is not in acute distress.  HENT:      Head: Normocephalic and atraumatic.      Mouth/Throat:      Mouth: Mucous membranes are moist.      Pharynx: Oropharynx is clear. No posterior oropharyngeal erythema.   Eyes:      Conjunctiva/sclera: Conjunctivae normal.   Cardiovascular:      Rate and Rhythm: Normal rate and regular rhythm.      Pulses: Normal pulses.      Heart sounds: Normal heart sounds. No murmur heard.  Pulmonary:      Effort: Pulmonary effort is normal. No respiratory distress.      Breath sounds: Normal breath sounds. No wheezing, rhonchi or rales.   Abdominal:      General: There is no distension.      Palpations: Abdomen is soft.      Tenderness: There is abdominal tenderness (Suprapubic region tenderness on deep palpation).   Musculoskeletal:      Cervical back: Normal range of motion and neck supple.      Right lower leg: No edema.      Left lower leg: No edema.   Skin:     General: Skin is warm.      Findings: No rash.   Neurological:      Mental Status: She is alert and oriented to person, place, and time.   Psychiatric:         Mood and Affect: Mood normal.         Behavior: Behavior normal.         ASSESSMENT AND PLAN:     Assessment & Plan  Missed period  Patient states that she has regular menstrual cycle however did not have period for past two months.  LMP 5/17/2025,   Missed periods not " associated with any PV discharge, spotting, associated with mild cramps around the time of her periods start dates.  Orders:    POCT Pregnancy  Negative today  -Patient advised to wait for the next 2 months for periods to come, suspicion of having missed periods because of stress or recent Vaginal infections.  - Follow back with PCP in next 2 months if no periods, will refer to OBGYN then.  Gonorrhea in female  Patient treated for Gonorrhea on 5/27. Received one shot of Ceftriaxone . Wants repeat testing now.  Orders:    Chlamydia/GC, PCR (Urine); Future    Bacterial vaginosis  Received treatment of BV in May,25. Completed course of Metronidazole. Now wants repeat testing.  Orders:    VAGINAL PATHOGENS DNA PANEL; Future         Return in about 2 months (around 9/18/2025). If periods missed for next two months too.      Iris Byers M.D.  [facility] Internal Medicine Resident         [1]   Past Medical History:  Diagnosis Date    Conjunctivitis 08/01/2023    Irregular periods 06/04/2020   [2] No past surgical history on file.  [3]   Social History  Tobacco Use    Smoking status: Never    Smokeless tobacco: Never   Vaping Use    Vaping status: Never Used   Substance Use Topics    Alcohol use: Yes     Comment: rare    Drug use: No   [4]   No current outpatient medications on file.     No current facility-administered medications for this visit.

## 2025-07-24 DIAGNOSIS — N76.0 BACTERIAL VAGINOSIS: ICD-10-CM

## 2025-07-24 DIAGNOSIS — B96.89 BACTERIAL VAGINOSIS: ICD-10-CM

## 2025-08-28 ENCOUNTER — APPOINTMENT (OUTPATIENT)
Dept: INTERNAL MEDICINE | Facility: OTHER | Age: 30
End: 2025-08-28
Payer: COMMERCIAL